# Patient Record
Sex: MALE | NOT HISPANIC OR LATINO | URBAN - METROPOLITAN AREA
[De-identification: names, ages, dates, MRNs, and addresses within clinical notes are randomized per-mention and may not be internally consistent; named-entity substitution may affect disease eponyms.]

---

## 2017-10-27 ENCOUNTER — EMERGENCY (EMERGENCY)
Facility: HOSPITAL | Age: 27
LOS: 1 days | Discharge: LEFT BEFORE DISCHARGE | End: 2017-10-27

## 2017-10-27 VITALS
HEART RATE: 75 BPM | HEIGHT: 66.93 IN | WEIGHT: 132.28 LBS | DIASTOLIC BLOOD PRESSURE: 61 MMHG | SYSTOLIC BLOOD PRESSURE: 96 MMHG | TEMPERATURE: 97 F | RESPIRATION RATE: 16 BRPM | OXYGEN SATURATION: 98 %

## 2017-10-27 DIAGNOSIS — Z53.21 PROCEDURE AND TREATMENT NOT CARRIED OUT DUE TO PATIENT LEAVING PRIOR TO BEING SEEN BY HEALTH CARE PROVIDER: ICD-10-CM

## 2017-10-27 DIAGNOSIS — R10.9 UNSPECIFIED ABDOMINAL PAIN: ICD-10-CM

## 2017-10-27 DIAGNOSIS — M54.9 DORSALGIA, UNSPECIFIED: ICD-10-CM

## 2021-08-30 ENCOUNTER — INPATIENT (INPATIENT)
Facility: HOSPITAL | Age: 31
LOS: 3 days | Discharge: HOME | DRG: 871 | End: 2021-09-03
Attending: INTERNAL MEDICINE | Admitting: INTERNAL MEDICINE
Payer: MEDICAID

## 2021-08-30 VITALS
WEIGHT: 141.1 LBS | HEART RATE: 121 BPM | OXYGEN SATURATION: 94 % | SYSTOLIC BLOOD PRESSURE: 101 MMHG | RESPIRATION RATE: 22 BRPM | DIASTOLIC BLOOD PRESSURE: 72 MMHG | TEMPERATURE: 100 F | HEIGHT: 66.93 IN

## 2021-08-30 DIAGNOSIS — Z98.890 OTHER SPECIFIED POSTPROCEDURAL STATES: Chronic | ICD-10-CM

## 2021-08-30 DIAGNOSIS — R79.89 OTHER SPECIFIED ABNORMAL FINDINGS OF BLOOD CHEMISTRY: ICD-10-CM

## 2021-08-30 DIAGNOSIS — U07.1 COVID-19: ICD-10-CM

## 2021-08-30 DIAGNOSIS — Z29.9 ENCOUNTER FOR PROPHYLACTIC MEASURES, UNSPECIFIED: ICD-10-CM

## 2021-08-30 DIAGNOSIS — J18.9 PNEUMONIA, UNSPECIFIED ORGANISM: ICD-10-CM

## 2021-08-30 DIAGNOSIS — R09.89 OTHER SPECIFIED SYMPTOMS AND SIGNS INVOLVING THE CIRCULATORY AND RESPIRATORY SYSTEMS: ICD-10-CM

## 2021-08-30 LAB
ALBUMIN SERPL ELPH-MCNC: 3.3 G/DL — LOW (ref 3.5–5)
ALP SERPL-CCNC: 41 U/L — SIGNIFICANT CHANGE UP (ref 40–120)
ALT FLD-CCNC: 30 U/L DA — SIGNIFICANT CHANGE UP (ref 10–60)
ANION GAP SERPL CALC-SCNC: 6 MMOL/L — SIGNIFICANT CHANGE UP (ref 5–17)
AST SERPL-CCNC: 21 U/L — SIGNIFICANT CHANGE UP (ref 10–40)
BASOPHILS # BLD AUTO: 0.01 K/UL — SIGNIFICANT CHANGE UP (ref 0–0.2)
BASOPHILS NFR BLD AUTO: 0.3 % — SIGNIFICANT CHANGE UP (ref 0–2)
BILIRUB SERPL-MCNC: 0.3 MG/DL — SIGNIFICANT CHANGE UP (ref 0.2–1.2)
BUN SERPL-MCNC: 10 MG/DL — SIGNIFICANT CHANGE UP (ref 7–18)
CALCIUM SERPL-MCNC: 7.9 MG/DL — LOW (ref 8.4–10.5)
CHLORIDE SERPL-SCNC: 107 MMOL/L — SIGNIFICANT CHANGE UP (ref 96–108)
CO2 SERPL-SCNC: 26 MMOL/L — SIGNIFICANT CHANGE UP (ref 22–31)
CREAT SERPL-MCNC: 0.97 MG/DL — SIGNIFICANT CHANGE UP (ref 0.5–1.3)
D DIMER BLD IA.RAPID-MCNC: 442 NG/ML DDU — HIGH
EOSINOPHIL # BLD AUTO: 0.02 K/UL — SIGNIFICANT CHANGE UP (ref 0–0.5)
EOSINOPHIL NFR BLD AUTO: 0.6 % — SIGNIFICANT CHANGE UP (ref 0–6)
FIBRINOGEN PPP-MCNC: 469 MG/DL — SIGNIFICANT CHANGE UP (ref 290–520)
GLUCOSE BLDC GLUCOMTR-MCNC: 102 MG/DL — HIGH (ref 70–99)
GLUCOSE SERPL-MCNC: 112 MG/DL — HIGH (ref 70–99)
HCT VFR BLD CALC: 41 % — SIGNIFICANT CHANGE UP (ref 39–50)
HGB BLD-MCNC: 14.1 G/DL — SIGNIFICANT CHANGE UP (ref 13–17)
IMM GRANULOCYTES NFR BLD AUTO: 0.6 % — SIGNIFICANT CHANGE UP (ref 0–1.5)
LDH SERPL L TO P-CCNC: 288 U/L — HIGH (ref 120–225)
LYMPHOCYTES # BLD AUTO: 1.13 K/UL — SIGNIFICANT CHANGE UP (ref 1–3.3)
LYMPHOCYTES # BLD AUTO: 36.7 % — SIGNIFICANT CHANGE UP (ref 13–44)
MCHC RBC-ENTMCNC: 30.5 PG — SIGNIFICANT CHANGE UP (ref 27–34)
MCHC RBC-ENTMCNC: 34.4 GM/DL — SIGNIFICANT CHANGE UP (ref 32–36)
MCV RBC AUTO: 88.6 FL — SIGNIFICANT CHANGE UP (ref 80–100)
MONOCYTES # BLD AUTO: 0.22 K/UL — SIGNIFICANT CHANGE UP (ref 0–0.9)
MONOCYTES NFR BLD AUTO: 7.1 % — SIGNIFICANT CHANGE UP (ref 2–14)
NEUTROPHILS # BLD AUTO: 1.68 K/UL — LOW (ref 1.8–7.4)
NEUTROPHILS NFR BLD AUTO: 54.7 % — SIGNIFICANT CHANGE UP (ref 43–77)
NRBC # BLD: 0 /100 WBCS — SIGNIFICANT CHANGE UP (ref 0–0)
PLATELET # BLD AUTO: 150 K/UL — SIGNIFICANT CHANGE UP (ref 150–400)
POTASSIUM SERPL-MCNC: 3.8 MMOL/L — SIGNIFICANT CHANGE UP (ref 3.5–5.3)
POTASSIUM SERPL-SCNC: 3.8 MMOL/L — SIGNIFICANT CHANGE UP (ref 3.5–5.3)
PROT SERPL-MCNC: 7 G/DL — SIGNIFICANT CHANGE UP (ref 6–8.3)
RBC # BLD: 4.63 M/UL — SIGNIFICANT CHANGE UP (ref 4.2–5.8)
RBC # FLD: 12.5 % — SIGNIFICANT CHANGE UP (ref 10.3–14.5)
SARS-COV-2 RNA SPEC QL NAA+PROBE: DETECTED
SODIUM SERPL-SCNC: 139 MMOL/L — SIGNIFICANT CHANGE UP (ref 135–145)
TROPONIN I SERPL-MCNC: <0.015 NG/ML — SIGNIFICANT CHANGE UP (ref 0–0.04)
WBC # BLD: 3.08 K/UL — LOW (ref 3.8–10.5)
WBC # FLD AUTO: 3.08 K/UL — LOW (ref 3.8–10.5)

## 2021-08-30 PROCEDURE — 99285 EMERGENCY DEPT VISIT HI MDM: CPT | Mod: CS

## 2021-08-30 PROCEDURE — 71045 X-RAY EXAM CHEST 1 VIEW: CPT | Mod: 26

## 2021-08-30 RX ORDER — AZITHROMYCIN 500 MG/1
500 TABLET, FILM COATED ORAL DAILY
Refills: 0 | Status: DISCONTINUED | OUTPATIENT
Start: 2021-08-30 | End: 2021-09-01

## 2021-08-30 RX ORDER — CEFTRIAXONE 500 MG/1
1000 INJECTION, POWDER, FOR SOLUTION INTRAMUSCULAR; INTRAVENOUS EVERY 24 HOURS
Refills: 0 | Status: DISCONTINUED | OUTPATIENT
Start: 2021-08-30 | End: 2021-09-01

## 2021-08-30 RX ORDER — REMDESIVIR 5 MG/ML
INJECTION INTRAVENOUS
Refills: 0 | Status: DISCONTINUED | OUTPATIENT
Start: 2021-08-30 | End: 2021-09-03

## 2021-08-30 RX ORDER — ACETAMINOPHEN 500 MG
975 TABLET ORAL ONCE
Refills: 0 | Status: COMPLETED | OUTPATIENT
Start: 2021-08-30 | End: 2021-08-30

## 2021-08-30 RX ORDER — DEXAMETHASONE 0.5 MG/5ML
6 ELIXIR ORAL DAILY
Refills: 0 | Status: DISCONTINUED | OUTPATIENT
Start: 2021-08-30 | End: 2021-09-03

## 2021-08-30 RX ORDER — REMDESIVIR 5 MG/ML
100 INJECTION INTRAVENOUS EVERY 24 HOURS
Refills: 0 | Status: DISCONTINUED | OUTPATIENT
Start: 2021-08-31 | End: 2021-09-03

## 2021-08-30 RX ORDER — ALBUTEROL 90 UG/1
2 AEROSOL, METERED ORAL EVERY 4 HOURS
Refills: 0 | Status: DISCONTINUED | OUTPATIENT
Start: 2021-08-30 | End: 2021-09-03

## 2021-08-30 RX ORDER — SODIUM CHLORIDE 9 MG/ML
1000 INJECTION INTRAMUSCULAR; INTRAVENOUS; SUBCUTANEOUS ONCE
Refills: 0 | Status: COMPLETED | OUTPATIENT
Start: 2021-08-30 | End: 2021-08-30

## 2021-08-30 RX ORDER — ACETAMINOPHEN 500 MG
650 TABLET ORAL EVERY 4 HOURS
Refills: 0 | Status: DISCONTINUED | OUTPATIENT
Start: 2021-08-30 | End: 2021-09-03

## 2021-08-30 RX ORDER — ENOXAPARIN SODIUM 100 MG/ML
40 INJECTION SUBCUTANEOUS DAILY
Refills: 0 | Status: DISCONTINUED | OUTPATIENT
Start: 2021-08-30 | End: 2021-09-03

## 2021-08-30 RX ORDER — REMDESIVIR 5 MG/ML
200 INJECTION INTRAVENOUS EVERY 24 HOURS
Refills: 0 | Status: COMPLETED | OUTPATIENT
Start: 2021-08-30 | End: 2021-08-30

## 2021-08-30 RX ORDER — PANTOPRAZOLE SODIUM 20 MG/1
40 TABLET, DELAYED RELEASE ORAL
Refills: 0 | Status: DISCONTINUED | OUTPATIENT
Start: 2021-08-30 | End: 2021-09-03

## 2021-08-30 RX ORDER — INSULIN LISPRO 100/ML
VIAL (ML) SUBCUTANEOUS
Refills: 0 | Status: DISCONTINUED | OUTPATIENT
Start: 2021-08-30 | End: 2021-09-03

## 2021-08-30 RX ORDER — DEXAMETHASONE 0.5 MG/5ML
6 ELIXIR ORAL ONCE
Refills: 0 | Status: COMPLETED | OUTPATIENT
Start: 2021-08-30 | End: 2021-08-30

## 2021-08-30 RX ADMIN — CEFTRIAXONE 100 MILLIGRAM(S): 500 INJECTION, POWDER, FOR SOLUTION INTRAMUSCULAR; INTRAVENOUS at 21:14

## 2021-08-30 RX ADMIN — SODIUM CHLORIDE 1000 MILLILITER(S): 9 INJECTION INTRAMUSCULAR; INTRAVENOUS; SUBCUTANEOUS at 15:46

## 2021-08-30 RX ADMIN — PANTOPRAZOLE SODIUM 40 MILLIGRAM(S): 20 TABLET, DELAYED RELEASE ORAL at 20:45

## 2021-08-30 RX ADMIN — REMDESIVIR 500 MILLIGRAM(S): 5 INJECTION INTRAVENOUS at 20:45

## 2021-08-30 RX ADMIN — ENOXAPARIN SODIUM 40 MILLIGRAM(S): 100 INJECTION SUBCUTANEOUS at 20:45

## 2021-08-30 RX ADMIN — Medication 975 MILLIGRAM(S): at 15:46

## 2021-08-30 RX ADMIN — Medication 975 MILLIGRAM(S): at 16:00

## 2021-08-30 RX ADMIN — Medication 6 MILLIGRAM(S): at 19:11

## 2021-08-30 RX ADMIN — AZITHROMYCIN 500 MILLIGRAM(S): 500 TABLET, FILM COATED ORAL at 21:14

## 2021-08-30 RX ADMIN — Medication 6 MILLIGRAM(S): at 20:45

## 2021-08-30 NOTE — H&P ADULT - NSHPSOCIALHISTORY_GEN_ALL_CORE
Patient is  with two kids who live in [facility]. He visits them every 5 months.

## 2021-08-30 NOTE — H&P ADULT - NSHPPHYSICALEXAM_GEN_ALL_CORE
T(C): 39.4 (08-30-21 @ 15:20), Max: 39.4 (08-30-21 @ 15:20)  HR: 121 (08-30-21 @ 14:55) (121 - 121)  BP: 101/72 (08-30-21 @ 14:55) (101/72 - 101/72)  RR: 22 (08-30-21 @ 14:55) (22 - 22)  SpO2: 94% (08-30-21 @ 14:55) (94% - 94%)    GENERAL: patient resting comfortably on NC, no acute distress, appropriate, pleasant  EYES: sclera clear, no exudates  ENMT: oropharynx clear without erythema, no exudates, moist mucous membranes  NECK: supple, soft, no thyromegaly noted  LUNGS: Diffuse rhonchi, decreased breath sounds bilaterally at the bases. Coughing on deep inspiration. no wheezing, rhonchi or rales appreciated  HEART: Clear S1/S2, regular rate and rhythm, no murmurs noted, no lower extremity edema  GASTROINTESTINAL: abdomen is soft, nontender, nondistended, normoactive bowel sounds, no palpable masses  INTEGUMENT: good skin turgor, no lesions noted  MUSCULOSKELETAL: no clubbing or cyanosis, no obvious deformity  NEUROLOGIC: AAO x3, good muscle tone in 4 extremities, no obvious sensory deficits  PSYCHIATRIC: mood is good, affect is congruent, linear and logical thought process  HEME/LYMPH: no palpable supraclavicular nodules, no obvious ecchymosis or petechiae

## 2021-08-30 NOTE — ED ADULT NURSE NOTE - OBJECTIVE STATEMENT
MARIANNE SY COVERING NOTES: AOX4 +ambulatory patient reports he works as   and he just came back from [place]. Patient complaining of cough and chest pains

## 2021-08-30 NOTE — H&P ADULT - HISTORY OF PRESENT ILLNESS
30 YOM, , with no significant PMH presents with fevers and chest pain for 5 days. Patient is UNVACCINATED. Patient was experiencing sore throat on his ride from [place] to [place] and began to develop subjective fever pain, shortness of breath, cough and myalgias. No hemoptysis or swelling of legs. No loss of smell or taste. No headache, dizziness, n/v/d/c, or urinary complaints. Patient is a nonsmoker. Patient has no known COVID contacts, lives alone, and has minimal contact with others on the road.    In ED: Patient is febrile to 103F, , RR 22, Sat 94% on RA   CXR shows bilateral Infiltrates  COVID PCR+

## 2021-08-30 NOTE — ED PROVIDER NOTE - DR. NAME
“You can access the FollowHealth Patient Portal, offered by [facility], by registering with the following website: [email or web address]”
Joseph Mc)

## 2021-08-30 NOTE — H&P ADULT - PROBLEM SELECTOR PLAN 1
- Patient p/w SOB, fever to 103F, tachy to 124, RR 22, Desaturating to 94 % on RA  - Unvaccinated  - CXR shows bilateral infiltrates   - COVID PCR+  - Pt resting comfortably on 2 L NC .  - Supportive measures (tylenol).  - Started on remdsivir and decadron.   - F/u COVID inflammatory markers .  - Monitor respiratory status - Patient p/w SOB, fever to 103F, tachy to 124, RR 22, Desaturating to 94 % on RA  - Unvaccinated  - CXR shows bilateral infiltrates   - COVID PCR+  - Pt resting comfortably on 2 L NC .  - Supportive measures (tylenol).  - Started on remdsivir and decadron.   - F/u COVID inflammatory markers .  - Monitor respiratory status  Zithromax and rocephin ordered, D/c if procal wnl  -ID [place] for remdesivir

## 2021-08-30 NOTE — H&P ADULT - ATTENDING COMMENTS
30 YOM, , with no significant PMH presents with fevers and chest pain for 5 days. Patient is UNVACCINATED. Patient was experiencing sore throat on his ride from [place] to [place] and began to develop subjective fever pain, shortness of breath, cough and myalgias. No hemoptysis or swelling of legs. No loss of smell or taste. No headache, dizziness, n/v/d/c, or urinary complaints. Patient is a nonsmoker. Patient has no known COVID contacts, lives alone, and has minimal contact with others on the road.    In ED: Patient is febrile to 103F, , RR 22, Sat 94% on RA   CXR shows bilateral Infiltrates  COVID PCR+      assessment  --  sepsis, pneumonia 2nd to covid 19,     plan  --  admit to med, rocephin, zithromax, decadron, vit c, vitamin d, zinc, pepcid, singulair, contact and airborne isolation, cont albuterol inhaler, cont supportive care with tylenol prn, robitussin prn and O2 via nasal canula as needed, lovenox    covid-19 antibody test, procalcitonin, D-dimer, crp, ldh, ferritin, lactate, cbc, bmp, mg, phos, lipids, tsh, bld cx, ua, ucx      pulm cons

## 2021-08-30 NOTE — H&P ADULT - PROBLEM SELECTOR PLAN 2
-Ddimer to 400  -Patient is a , presented with tachycardia, SOB   -no calf swelling or hemoptysis  - PERC 2  -Consider CTPA ?

## 2021-08-30 NOTE — ED PROVIDER NOTE - CLINICAL SUMMARY MEDICAL DECISION MAKING FREE TEXT BOX
Very likely covid 19, however PNA possibility. Will get CXR, basic blood work, covid swab, 2L nasal cannula, and admit.

## 2021-08-30 NOTE — ED PROVIDER NOTE - CARE PLAN
Principal Discharge DX:	Multifocal pneumonia  Assessment and plan of treatment:	presumptive:  Secondary Diagnosis:	2019 novel coronavirus disease (COVID-19)   1

## 2021-08-30 NOTE — H&P ADULT - ASSESSMENT
30 YOM, , no signifcant PMH, presented with fevers, myalgias, cough, SOB. Bilateral infiltrates on CXR. Admitted to medicine for COVID PNA+ requiring O2 supplementation with NC

## 2021-08-31 DIAGNOSIS — R79.89 OTHER SPECIFIED ABNORMAL FINDINGS OF BLOOD CHEMISTRY: ICD-10-CM

## 2021-08-31 DIAGNOSIS — Z02.9 ENCOUNTER FOR ADMINISTRATIVE EXAMINATIONS, UNSPECIFIED: ICD-10-CM

## 2021-08-31 LAB
A1C WITH ESTIMATED AVERAGE GLUCOSE RESULT: 5.6 % — SIGNIFICANT CHANGE UP (ref 4–5.6)
ALBUMIN SERPL ELPH-MCNC: 3.4 G/DL — LOW (ref 3.5–5)
ALP SERPL-CCNC: 44 U/L — SIGNIFICANT CHANGE UP (ref 40–120)
ALT FLD-CCNC: 30 U/L DA — SIGNIFICANT CHANGE UP (ref 10–60)
ANION GAP SERPL CALC-SCNC: 6 MMOL/L — SIGNIFICANT CHANGE UP (ref 5–17)
AST SERPL-CCNC: 18 U/L — SIGNIFICANT CHANGE UP (ref 10–40)
BASOPHILS # BLD AUTO: 0 K/UL — SIGNIFICANT CHANGE UP (ref 0–0.2)
BASOPHILS NFR BLD AUTO: 0 % — SIGNIFICANT CHANGE UP (ref 0–2)
BILIRUB SERPL-MCNC: 0.4 MG/DL — SIGNIFICANT CHANGE UP (ref 0.2–1.2)
BUN SERPL-MCNC: 10 MG/DL — SIGNIFICANT CHANGE UP (ref 7–18)
CALCIUM SERPL-MCNC: 8.5 MG/DL — SIGNIFICANT CHANGE UP (ref 8.4–10.5)
CHLORIDE SERPL-SCNC: 106 MMOL/L — SIGNIFICANT CHANGE UP (ref 96–108)
CHOLEST SERPL-MCNC: 168 MG/DL — SIGNIFICANT CHANGE UP
CO2 SERPL-SCNC: 28 MMOL/L — SIGNIFICANT CHANGE UP (ref 22–31)
COVID-19 NUCLEOCAPSID GAM AB INTERP: POSITIVE
COVID-19 NUCLEOCAPSID TOTAL GAM ANTIBODY RESULT: 1.99 INDEX — HIGH
COVID-19 SPIKE DOMAIN AB INTERP: NEGATIVE — SIGNIFICANT CHANGE UP
COVID-19 SPIKE DOMAIN ANTIBODY RESULT: 0.55 U/ML — SIGNIFICANT CHANGE UP
CREAT SERPL-MCNC: 0.71 MG/DL — SIGNIFICANT CHANGE UP (ref 0.5–1.3)
CRP SERPL-MCNC: 16 MG/L — HIGH
CRP SERPL-MCNC: 21 MG/L — HIGH
D DIMER BLD IA.RAPID-MCNC: 207 NG/ML DDU — SIGNIFICANT CHANGE UP
EOSINOPHIL # BLD AUTO: 0 K/UL — SIGNIFICANT CHANGE UP (ref 0–0.5)
EOSINOPHIL NFR BLD AUTO: 0 % — SIGNIFICANT CHANGE UP (ref 0–6)
ESTIMATED AVERAGE GLUCOSE: 114 MG/DL — SIGNIFICANT CHANGE UP (ref 68–114)
FERRITIN SERPL-MCNC: 357 NG/ML — SIGNIFICANT CHANGE UP (ref 30–400)
FERRITIN SERPL-MCNC: 381 NG/ML — SIGNIFICANT CHANGE UP (ref 30–400)
GLUCOSE BLDC GLUCOMTR-MCNC: 112 MG/DL — HIGH (ref 70–99)
GLUCOSE BLDC GLUCOMTR-MCNC: 130 MG/DL — HIGH (ref 70–99)
GLUCOSE BLDC GLUCOMTR-MCNC: 156 MG/DL — HIGH (ref 70–99)
GLUCOSE BLDC GLUCOMTR-MCNC: 161 MG/DL — HIGH (ref 70–99)
GLUCOSE SERPL-MCNC: 119 MG/DL — HIGH (ref 70–99)
HCT VFR BLD CALC: 45.1 % — SIGNIFICANT CHANGE UP (ref 39–50)
HDLC SERPL-MCNC: 40 MG/DL — LOW
HGB BLD-MCNC: 15.4 G/DL — SIGNIFICANT CHANGE UP (ref 13–17)
IMM GRANULOCYTES NFR BLD AUTO: 0.7 % — SIGNIFICANT CHANGE UP (ref 0–1.5)
LIPID PNL WITH DIRECT LDL SERPL: 114 MG/DL — HIGH
LYMPHOCYTES # BLD AUTO: 0.58 K/UL — LOW (ref 1–3.3)
LYMPHOCYTES # BLD AUTO: 40.8 % — SIGNIFICANT CHANGE UP (ref 13–44)
MAGNESIUM SERPL-MCNC: 2.3 MG/DL — SIGNIFICANT CHANGE UP (ref 1.6–2.6)
MANUAL SMEAR VERIFICATION: SIGNIFICANT CHANGE UP
MCHC RBC-ENTMCNC: 30.3 PG — SIGNIFICANT CHANGE UP (ref 27–34)
MCHC RBC-ENTMCNC: 34.1 GM/DL — SIGNIFICANT CHANGE UP (ref 32–36)
MCV RBC AUTO: 88.8 FL — SIGNIFICANT CHANGE UP (ref 80–100)
MONOCYTES # BLD AUTO: 0.1 K/UL — SIGNIFICANT CHANGE UP (ref 0–0.9)
MONOCYTES NFR BLD AUTO: 7 % — SIGNIFICANT CHANGE UP (ref 2–14)
NEUTROPHILS # BLD AUTO: 0.73 K/UL — LOW (ref 1.8–7.4)
NEUTROPHILS NFR BLD AUTO: 51.5 % — SIGNIFICANT CHANGE UP (ref 43–77)
NON HDL CHOLESTEROL: 128 MG/DL — SIGNIFICANT CHANGE UP
NRBC # BLD: 0 /100 WBCS — SIGNIFICANT CHANGE UP (ref 0–0)
PHOSPHATE SERPL-MCNC: 3.2 MG/DL — SIGNIFICANT CHANGE UP (ref 2.5–4.5)
PLAT MORPH BLD: NORMAL — SIGNIFICANT CHANGE UP
PLATELET # BLD AUTO: 162 K/UL — SIGNIFICANT CHANGE UP (ref 150–400)
POTASSIUM SERPL-MCNC: 4.2 MMOL/L — SIGNIFICANT CHANGE UP (ref 3.5–5.3)
POTASSIUM SERPL-SCNC: 4.2 MMOL/L — SIGNIFICANT CHANGE UP (ref 3.5–5.3)
PROCALCITONIN SERPL-MCNC: 0.03 NG/ML — SIGNIFICANT CHANGE UP (ref 0.02–0.1)
PROCALCITONIN SERPL-MCNC: 0.04 NG/ML — SIGNIFICANT CHANGE UP (ref 0.02–0.1)
PROT SERPL-MCNC: 7.7 G/DL — SIGNIFICANT CHANGE UP (ref 6–8.3)
RBC # BLD: 5.08 M/UL — SIGNIFICANT CHANGE UP (ref 4.2–5.8)
RBC # FLD: 12.4 % — SIGNIFICANT CHANGE UP (ref 10.3–14.5)
RBC BLD AUTO: NORMAL — SIGNIFICANT CHANGE UP
SARS-COV-2 IGG+IGM SERPL QL IA: 0.55 U/ML — SIGNIFICANT CHANGE UP
SARS-COV-2 IGG+IGM SERPL QL IA: 1.99 INDEX — HIGH
SARS-COV-2 IGG+IGM SERPL QL IA: NEGATIVE — SIGNIFICANT CHANGE UP
SARS-COV-2 IGG+IGM SERPL QL IA: POSITIVE
SODIUM SERPL-SCNC: 140 MMOL/L — SIGNIFICANT CHANGE UP (ref 135–145)
TRIGL SERPL-MCNC: 68 MG/DL — SIGNIFICANT CHANGE UP
TSH SERPL-MCNC: 0.21 UU/ML — LOW (ref 0.34–4.82)
WBC # BLD: 1.42 K/UL — LOW (ref 3.8–10.5)
WBC # FLD AUTO: 1.42 K/UL — LOW (ref 3.8–10.5)

## 2021-08-31 RX ORDER — CHOLECALCIFEROL (VITAMIN D3) 125 MCG
2000 CAPSULE ORAL DAILY
Refills: 0 | Status: DISCONTINUED | OUTPATIENT
Start: 2021-08-31 | End: 2021-09-03

## 2021-08-31 RX ORDER — ASCORBIC ACID 60 MG
1000 TABLET,CHEWABLE ORAL DAILY
Refills: 0 | Status: DISCONTINUED | OUTPATIENT
Start: 2021-08-31 | End: 2021-09-03

## 2021-08-31 RX ORDER — ZINC SULFATE TAB 220 MG (50 MG ZINC EQUIVALENT) 220 (50 ZN) MG
220 TAB ORAL DAILY
Refills: 0 | Status: DISCONTINUED | OUTPATIENT
Start: 2021-08-31 | End: 2021-09-03

## 2021-08-31 RX ORDER — MONTELUKAST 4 MG/1
10 TABLET, CHEWABLE ORAL AT BEDTIME
Refills: 0 | Status: DISCONTINUED | OUTPATIENT
Start: 2021-08-31 | End: 2021-09-03

## 2021-08-31 RX ADMIN — ENOXAPARIN SODIUM 40 MILLIGRAM(S): 100 INJECTION SUBCUTANEOUS at 11:24

## 2021-08-31 RX ADMIN — PANTOPRAZOLE SODIUM 40 MILLIGRAM(S): 20 TABLET, DELAYED RELEASE ORAL at 07:35

## 2021-08-31 RX ADMIN — MONTELUKAST 10 MILLIGRAM(S): 4 TABLET, CHEWABLE ORAL at 22:56

## 2021-08-31 RX ADMIN — Medication 1: at 11:50

## 2021-08-31 RX ADMIN — REMDESIVIR 500 MILLIGRAM(S): 5 INJECTION INTRAVENOUS at 22:08

## 2021-08-31 RX ADMIN — Medication 1000 MILLIGRAM(S): at 11:25

## 2021-08-31 RX ADMIN — AZITHROMYCIN 500 MILLIGRAM(S): 500 TABLET, FILM COATED ORAL at 11:26

## 2021-08-31 RX ADMIN — CEFTRIAXONE 100 MILLIGRAM(S): 500 INJECTION, POWDER, FOR SOLUTION INTRAMUSCULAR; INTRAVENOUS at 22:56

## 2021-08-31 RX ADMIN — Medication 6 MILLIGRAM(S): at 07:34

## 2021-08-31 RX ADMIN — Medication 2000 UNIT(S): at 11:25

## 2021-08-31 RX ADMIN — Medication 1: at 17:13

## 2021-08-31 RX ADMIN — ZINC SULFATE TAB 220 MG (50 MG ZINC EQUIVALENT) 220 MILLIGRAM(S): 220 (50 ZN) TAB at 11:25

## 2021-08-31 NOTE — PROGRESS NOTE ADULT - PROBLEM SELECTOR PLAN 2
TSH low  possible COVID relation  f/u outpatient with Endocrinologist as patient is asymptomatic for Hyperthyroidism

## 2021-08-31 NOTE — PROGRESS NOTE ADULT - SUBJECTIVE AND OBJECTIVE BOX
Patient is a 30y old  Male who presents with a chief complaint of COVID-19 (30 Aug 2021 19:55)    pt seen in icu [  ], reg med floor [   ], bed [  ], chair at bedside [   ], a+o x3 [  ], lethargic [  ],  nad [  ]    campbell [  ], ngt [  ], peg [  ], et tube [  ], cent line [  ], picc line [  ]        Allergies    No Known Allergies        Vitals    T(F): 97.3 (08-31-21 @ 05:31), Max: 103 (08-30-21 @ 15:20)  HR: 65 (08-31-21 @ 05:31) (63 - 121)  BP: 100/63 (08-31-21 @ 05:31) (93/62 - 110/76)  RR: 18 (08-31-21 @ 05:31) (17 - 22)  SpO2: 99% (08-31-21 @ 05:31) (94% - 99%)  Wt(kg): --  CAPILLARY BLOOD GLUCOSE      POCT Blood Glucose.: 102 mg/dL (30 Aug 2021 21:07)      Labs                          14.1   3.08  )-----------( 150      ( 30 Aug 2021 16:12 )             41.0       08-30    139  |  107  |  10  ----------------------------<  112<H>  3.8   |  26  |  0.97    Ca    7.9<L>      30 Aug 2021 16:11    TPro  7.0  /  Alb  3.3<L>  /  TBili  0.3  /  DBili  x   /  AST  21  /  ALT  30  /  AlkPhos  41  08-30      CARDIAC MARKERS ( 30 Aug 2021 16:11 )  <0.015 ng/mL / x     / x     / x     / x                Radiology Results      Meds    MEDICATIONS  (STANDING):  ascorbic acid 1000 milliGRAM(s) Oral daily  azithromycin   Tablet 500 milliGRAM(s) Oral daily  cefTRIAXone   IVPB 1000 milliGRAM(s) IV Intermittent every 24 hours  cholecalciferol 2000 Unit(s) Oral daily  dexAMETHasone  Injectable 6 milliGRAM(s) IV Push daily  enoxaparin Injectable 40 milliGRAM(s) SubCutaneous daily  insulin lispro (ADMELOG) corrective regimen sliding scale   SubCutaneous Before meals and at bedtime  montelukast 10 milliGRAM(s) Oral at bedtime  pantoprazole    Tablet 40 milliGRAM(s) Oral before breakfast  remdesivir  IVPB   IV Intermittent   remdesivir  IVPB 100 milliGRAM(s) IV Intermittent every 24 hours  zinc sulfate 220 milliGRAM(s) Oral daily      MEDICATIONS  (PRN):  acetaminophen   Tablet .. 650 milliGRAM(s) Oral every 4 hours PRN Temp greater or equal to 38.5C (101.3F)  ALBUTerol    90 MICROgram(s) HFA Inhaler 2 Puff(s) Inhalation every 4 hours PRN Shortness of Breath and/or Wheezing      Physical Exam    Neuro :  no focal deficits  Respiratory: CTA B/L  CV: RRR, S1S2, no murmurs,   Abdominal: Soft, NT, ND +BS,  Extremities: No edema, + peripheral pulses    ASSESSMENT    Pneumonia due to organism    No pertinent past medical history    H/O bilateral inguinal hernia repair        PLAN     Patient is a 30y old  Male who presents with a chief complaint of COVID-19 (30 Aug 2021 19:55)    pt seen in icu [  ], reg med floor [  x ], bed [ x ], chair at bedside [   ], a+o x3 [ x ], lethargic [  ],  nad [ x ]      Allergies    No Known Allergies        Vitals    T(F): 97.3 (08-31-21 @ 05:31), Max: 103 (08-30-21 @ 15:20)  HR: 65 (08-31-21 @ 05:31) (63 - 121)  BP: 100/63 (08-31-21 @ 05:31) (93/62 - 110/76)  RR: 18 (08-31-21 @ 05:31) (17 - 22)  SpO2: 99% (08-31-21 @ 05:31) (94% - 99%)  Wt(kg): --  CAPILLARY BLOOD GLUCOSE      POCT Blood Glucose.: 102 mg/dL (30 Aug 2021 21:07)      Labs                          14.1   3.08  )-----------( 150      ( 30 Aug 2021 16:12 )             41.0       08-30    139  |  107  |  10  ----------------------------<  112<H>  3.8   |  26  |  0.97    Ca    7.9<L>      30 Aug 2021 16:11    TPro  7.0  /  Alb  3.3<L>  /  TBili  0.3  /  DBili  x   /  AST  21  /  ALT  30  /  AlkPhos  41  08-30    Procalcitonin, Serum (08.31.21 @ 01:10)   Procalcitonin, Serum: 0.04:    CARDIAC MARKERS ( 30 Aug 2021 16:11 )  <0.015 ng/mL / x     / x     / x     / x                Radiology Results      Meds    MEDICATIONS  (STANDING):  ascorbic acid 1000 milliGRAM(s) Oral daily  azithromycin   Tablet 500 milliGRAM(s) Oral daily  cefTRIAXone   IVPB 1000 milliGRAM(s) IV Intermittent every 24 hours  cholecalciferol 2000 Unit(s) Oral daily  dexAMETHasone  Injectable 6 milliGRAM(s) IV Push daily  enoxaparin Injectable 40 milliGRAM(s) SubCutaneous daily  insulin lispro (ADMELOG) corrective regimen sliding scale   SubCutaneous Before meals and at bedtime  montelukast 10 milliGRAM(s) Oral at bedtime  pantoprazole    Tablet 40 milliGRAM(s) Oral before breakfast  remdesivir  IVPB   IV Intermittent   remdesivir  IVPB 100 milliGRAM(s) IV Intermittent every 24 hours  zinc sulfate 220 milliGRAM(s) Oral daily      MEDICATIONS  (PRN):  acetaminophen   Tablet .. 650 milliGRAM(s) Oral every 4 hours PRN Temp greater or equal to 38.5C (101.3F)  ALBUTerol    90 MICROgram(s) HFA Inhaler 2 Puff(s) Inhalation every 4 hours PRN Shortness of Breath and/or Wheezing      Physical Exam    Neuro :  no focal deficits  Respiratory: CTA B/L  CV: RRR, S1S2, no murmurs,   Abdominal: Soft, NT, ND +BS,  Extremities: No edema, + peripheral pulses      ASSESSMENT    sepsis,   pneumonia 2nd to covid 19  H/O bilateral inguinal hernia repair        PLAN    d/c rocephin, zithromax given procal neg,   cont decadron,   cont vit c, vitamin d, zinc, pepcid, singulair,   contact and airborne isolation,   pulm cons   cont albuterol inhaler,   tmx 103  cont tylenol prn,   cont robitussin prn   O2 sat  99% (94% - 99%) n/c 2L  cont O2 via nasal canula as needed,   cont lovenox  f/u covid-19 antibody test,   f/u D-dimer, crp, ldh, ferritin, lactate,   cont current meds

## 2021-08-31 NOTE — PROGRESS NOTE ADULT - PROBLEM SELECTOR PLAN 1
SpO2 98% 2LNC  CXR shows bilateral infiltrates   c/w remdsivir and decadron.   f/u COVID inflammatory markers   f/u procalcitonin  c/w Zithromax and rocephin until procalcitonin result normal  -ID Dr. Sabillon

## 2021-08-31 NOTE — CONSULT NOTE ADULT - SUBJECTIVE AND OBJECTIVE BOX
PULMONARY CONSULT NOTE      AWAIS SEVILLA  MRN-[id number]    Patient is a 30y old  Male who presents with a chief complaint of COVID-19 (31 Aug 2021 07:42)      HISTORY OF PRESENT ILLNESS:  History of Present Illness:  Reason for Admission: COVID-19  History of Present Illness:   30 YOM, , with no significant PMH presents with fevers and chest pain for 5 days. Patient is UNVACCINATED. Patient was experiencing sore throat on his ride from [place] to [place] and began to develop subjective fever pain, shortness of breath, cough and myalgias. No hemoptysis or swelling of legs. No loss of smell or taste. No headache, dizziness, n/v/d/c, or urinary complaints. Patient is a nonsmoker. Patient has no known COVID contacts, lives alone, and has minimal contact with others on the road.    In ED: Patient is febrile to 103F, , RR 22, Sat 94% on RA   CXR shows bilateral Infiltrates  COVID PCR+    Pt is awake, alert, lying in bed in NAD. On O2 NC     MEDICATIONS  (STANDING):  ascorbic acid 1000 milliGRAM(s) Oral daily  azithromycin   Tablet 500 milliGRAM(s) Oral daily  cefTRIAXone   IVPB 1000 milliGRAM(s) IV Intermittent every 24 hours  cholecalciferol 2000 Unit(s) Oral daily  dexAMETHasone  Injectable 6 milliGRAM(s) IV Push daily  enoxaparin Injectable 40 milliGRAM(s) SubCutaneous daily  insulin lispro (ADMELOG) corrective regimen sliding scale   SubCutaneous Before meals and at bedtime  montelukast 10 milliGRAM(s) Oral at bedtime  pantoprazole    Tablet 40 milliGRAM(s) Oral before breakfast  remdesivir  IVPB   IV Intermittent   remdesivir  IVPB 100 milliGRAM(s) IV Intermittent every 24 hours  zinc sulfate 220 milliGRAM(s) Oral daily      MEDICATIONS  (PRN):  acetaminophen   Tablet .. 650 milliGRAM(s) Oral every 4 hours PRN Temp greater or equal to 38.5C (101.3F)  ALBUTerol    90 MICROgram(s) HFA Inhaler 2 Puff(s) Inhalation every 4 hours PRN Shortness of Breath and/or Wheezing      Allergies    No Known Allergies    Intolerances        PAST MEDICAL & SURGICAL HISTORY:  No pertinent past medical history    H/O bilateral inguinal hernia repair        FAMILY HISTORY:      SOCIAL HISTORY  Smoking History:     REVIEW OF SYSTEMS:    CONSTITUTIONAL:  No fevers, chills, sweats    HEENT:  Eyes:  No diplopia or blurred vision. ENT:  No earache, sore throat or runny nose.    CARDIOVASCULAR:  No pressure, squeezing, tightness, or heaviness about the chest; no palpitations.    RESPIRATORY:  Per HPI    GASTROINTESTINAL:  No abdominal pain, nausea, vomiting or diarrhea.    GENITOURINARY:  No dysuria, frequency or urgency.    NEUROLOGIC:  No paresthesias, fasciculations, seizures or weakness.    PSYCHIATRIC:  No disorder of thought or mood.    Vital Signs Last 24 Hrs  T(C): 36.3 (31 Aug 2021 05:31), Max: 39.4 (30 Aug 2021 15:20)  T(F): 97.3 (31 Aug 2021 05:31), Max: 103 (30 Aug 2021 15:20)  HR: 65 (31 Aug 2021 05:31) (63 - 121)  BP: 100/63 (31 Aug 2021 05:31) (93/62 - 110/76)  BP(mean): --  RR: 18 (31 Aug 2021 05:31) (17 - 22)  SpO2: 99% (31 Aug 2021 05:31) (94% - 99%)  I&O's Detail      PHYSICAL EXAMINATION:    GENERAL: The patient is a well-developed, well-nourished no apparent distress.     HEENT: Head is normocephalic and atraumatic. Extraocular muscles are intact. Mucous membranes are moist.     NECK: Supple.     LUNGS: Clear to auscultation without wheezing, rales, or rhonchi. Respirations unlabored    HEART: Regular rate and rhythm without murmur.    ABDOMEN: Soft, nontender, and nondistended.  No hepatosplenomegaly is noted.    EXTREMITIES: Without any cyanosis, clubbing, rash, lesions or edema.    NEUROLOGIC: Grossly intact.      LABS:                        15.4   1.42  )-----------( 162      ( 31 Aug 2021 08:12 )             45.1     08-31    140  |  106  |  10  ----------------------------<  119<H>  4.2   |  28  |  0.71    Ca    8.5      31 Aug 2021 08:12  Phos  3.2     08-31  Mg     2.3     08-31    TPro  7.7  /  Alb  3.4<L>  /  TBili  0.4  /  DBili  x   /  AST  18  /  ALT  30  /  AlkPhos  44  08-31    COVID-19 PCR . (08.30.21 @ 16:11)   COVID-19 PCR: Detected: EUA/IVD     CARDIAC MARKERS ( 30 Aug 2021 16:11 )  <0.015 ng/mL / x     / x     / x     / x          D-Dimer Assay, Quantitative: 207 ng/mL DDU (08-31-21 @ 08:12)  D-Dimer Assay, Quantitative: 442 ng/mL DDU (08-30-21 @ 16:12)    MICROBIOLOGY:    RADIOLOGY & ADDITIONAL STUDIES:    CXR:  IMPRESSION: Bilateral infiltrates as above.    Ct scan chest:    ekg;    echo:

## 2021-08-31 NOTE — PROGRESS NOTE ADULT - SUBJECTIVE AND OBJECTIVE BOX
NP Note discussed with  Primary Attending    Patient is a 30y old  Male who presents with a chief complaint of COVID-19 (31 Aug 2021 11:02)      INTERVAL HPI/OVERNIGHT EVENTS: Patient seen and examined at bedside. SpO2 99% 2LNC at rest, no new complaints    MEDICATIONS  (STANDING):  ascorbic acid 1000 milliGRAM(s) Oral daily  azithromycin   Tablet 500 milliGRAM(s) Oral daily  cefTRIAXone   IVPB 1000 milliGRAM(s) IV Intermittent every 24 hours  cholecalciferol 2000 Unit(s) Oral daily  dexAMETHasone  Injectable 6 milliGRAM(s) IV Push daily  enoxaparin Injectable 40 milliGRAM(s) SubCutaneous daily  insulin lispro (ADMELOG) corrective regimen sliding scale   SubCutaneous Before meals and at bedtime  montelukast 10 milliGRAM(s) Oral at bedtime  pantoprazole    Tablet 40 milliGRAM(s) Oral before breakfast  remdesivir  IVPB   IV Intermittent   remdesivir  IVPB 100 milliGRAM(s) IV Intermittent every 24 hours  zinc sulfate 220 milliGRAM(s) Oral daily    MEDICATIONS  (PRN):  acetaminophen   Tablet .. 650 milliGRAM(s) Oral every 4 hours PRN Temp greater or equal to 38.5C (101.3F)  ALBUTerol    90 MICROgram(s) HFA Inhaler 2 Puff(s) Inhalation every 4 hours PRN Shortness of Breath and/or Wheezing      __________________________________________________  REVIEW OF SYSTEMS:    CONSTITUTIONAL: No fever,   EYES: no acute visual disturbances  NECK: No pain or stiffness  RESPIRATORY: No cough; No shortness of breath  CARDIOVASCULAR: No chest pain, no palpitations  GASTROINTESTINAL: No pain. No nausea or vomiting; No diarrhea   NEUROLOGICAL: No headache or numbness, no tremors  MUSCULOSKELETAL: No joint pain, no muscle pain  GENITOURINARY: no dysuria, no frequency, no hesitancy  PSYCHIATRY: no depression , no anxiety  ALL OTHER  ROS negative        Vital Signs Last 24 Hrs  T(C): 36.9 (31 Aug 2021 12:21), Max: 39.4 (30 Aug 2021 15:20)  T(F): 98.4 (31 Aug 2021 12:21), Max: 103 (30 Aug 2021 15:20)  HR: 67 (31 Aug 2021 12:21) (63 - 121)  BP: 91/62 (31 Aug 2021 12:21) (91/62 - 110/76)  BP(mean): --  RR: 17 (31 Aug 2021 12:21) (17 - 22)  SpO2: 100% (31 Aug 2021 12:21) (94% - 100%)    ________________________________________________  PHYSICAL EXAM:  GENERAL: NAD  HEENT: Normocephalic;  conjunctivae and sclerae clear; moist mucous membranes;   NECK : supple  CHEST/LUNG: bibasilar crackles  HEART: S1 S2  regular; no murmurs, gallops or rubs  ABDOMEN: Soft, Nontender, Nondistended; Bowel sounds present  EXTREMITIES: no cyanosis; no edema; no calf tenderness  SKIN: warm and dry; no rash  NERVOUS SYSTEM:  Awake and alert; Oriented  to place, person and time ; no new deficits    _________________________________________________  LABS:                        15.4   1.42  )-----------( 162      ( 31 Aug 2021 08:12 )             45.1     08-31    140  |  106  |  10  ----------------------------<  119<H>  4.2   |  28  |  0.71    Ca    8.5      31 Aug 2021 08:12  Phos  3.2     08-31  Mg     2.3     08-31    TPro  7.7  /  Alb  3.4<L>  /  TBili  0.4  /  DBili  x   /  AST  18  /  ALT  30  /  AlkPhos  44  08-31        CAPILLARY BLOOD GLUCOSE      POCT Blood Glucose.: 156 mg/dL (31 Aug 2021 11:33)  POCT Blood Glucose.: 112 mg/dL (31 Aug 2021 08:18)  POCT Blood Glucose.: 102 mg/dL (30 Aug 2021 21:07)        RADIOLOGY & ADDITIONAL TESTS:  < from: Xray Chest 1 View- PORTABLE-Urgent (08.30.21 @ 15:46) >    EXAM:  XR CHEST PORTABLE URGENT 1V                            PROCEDURE DATE:  08/30/2021          INTERPRETATION:  AP erect chest on August 30, 2021 at 3:40 PM. Patient is short of breath with cough and fever.    COMPARISON: None available.    Heart size is within normal limits.    There is slight scattered minimal lower lung field infiltrates mainly at the lung bases right greater than left. These findings are consistent with Covid Pneumonia.    IMPRESSION: Bilateral infiltrates as above.    < end of copied text >    Imaging  Reviewed:  YES/NO    Consultant(s) Notes Reviewed:   YES/ No      Plan of care was discussed with patient and /or primary care giver; all questions and concerns were addressed

## 2021-08-31 NOTE — CONSULT NOTE ADULT - ASSESSMENT
1. PNA 2nd to Covid-19  - PCR positive.   - CXR noted.  - Continue Vit C, Vit D, Zinc   - Started Remdesivir   - Continue Dexamethasone   - Continue Singulair and Pepcid   - Robitussin PRN for cough  - Tylenol PRN for temp   - O2 Supp - taper as tolerated by pt.   - Monitor O2 Sat.  - Monitor labs  - F.U CXR  - Prone positioning as tolerated   - Isolation precautions   - DVT and GI PPX

## 2021-09-01 LAB
ALBUMIN SERPL ELPH-MCNC: 2.9 G/DL — LOW (ref 3.5–5)
ALP SERPL-CCNC: 35 U/L — LOW (ref 40–120)
ALT FLD-CCNC: 23 U/L DA — SIGNIFICANT CHANGE UP (ref 10–60)
ANION GAP SERPL CALC-SCNC: 6 MMOL/L — SIGNIFICANT CHANGE UP (ref 5–17)
AST SERPL-CCNC: 12 U/L — SIGNIFICANT CHANGE UP (ref 10–40)
BILIRUB SERPL-MCNC: 0.3 MG/DL — SIGNIFICANT CHANGE UP (ref 0.2–1.2)
BUN SERPL-MCNC: 13 MG/DL — SIGNIFICANT CHANGE UP (ref 7–18)
CALCIUM SERPL-MCNC: 8.8 MG/DL — SIGNIFICANT CHANGE UP (ref 8.4–10.5)
CHLORIDE SERPL-SCNC: 107 MMOL/L — SIGNIFICANT CHANGE UP (ref 96–108)
CO2 SERPL-SCNC: 28 MMOL/L — SIGNIFICANT CHANGE UP (ref 22–31)
CREAT SERPL-MCNC: 0.69 MG/DL — SIGNIFICANT CHANGE UP (ref 0.5–1.3)
GLUCOSE BLDC GLUCOMTR-MCNC: 112 MG/DL — HIGH (ref 70–99)
GLUCOSE BLDC GLUCOMTR-MCNC: 122 MG/DL — HIGH (ref 70–99)
GLUCOSE BLDC GLUCOMTR-MCNC: 160 MG/DL — HIGH (ref 70–99)
GLUCOSE BLDC GLUCOMTR-MCNC: 164 MG/DL — HIGH (ref 70–99)
GLUCOSE SERPL-MCNC: 118 MG/DL — HIGH (ref 70–99)
HCT VFR BLD CALC: 42.4 % — SIGNIFICANT CHANGE UP (ref 39–50)
HGB BLD-MCNC: 14.7 G/DL — SIGNIFICANT CHANGE UP (ref 13–17)
MAGNESIUM SERPL-MCNC: 2.5 MG/DL — SIGNIFICANT CHANGE UP (ref 1.6–2.6)
MCHC RBC-ENTMCNC: 30.4 PG — SIGNIFICANT CHANGE UP (ref 27–34)
MCHC RBC-ENTMCNC: 34.7 GM/DL — SIGNIFICANT CHANGE UP (ref 32–36)
MCV RBC AUTO: 87.6 FL — SIGNIFICANT CHANGE UP (ref 80–100)
NRBC # BLD: 0 /100 WBCS — SIGNIFICANT CHANGE UP (ref 0–0)
PHOSPHATE SERPL-MCNC: 3.3 MG/DL — SIGNIFICANT CHANGE UP (ref 2.5–4.5)
PLATELET # BLD AUTO: 235 K/UL — SIGNIFICANT CHANGE UP (ref 150–400)
POTASSIUM SERPL-MCNC: 4 MMOL/L — SIGNIFICANT CHANGE UP (ref 3.5–5.3)
POTASSIUM SERPL-SCNC: 4 MMOL/L — SIGNIFICANT CHANGE UP (ref 3.5–5.3)
PROT SERPL-MCNC: 6.9 G/DL — SIGNIFICANT CHANGE UP (ref 6–8.3)
RBC # BLD: 4.84 M/UL — SIGNIFICANT CHANGE UP (ref 4.2–5.8)
RBC # FLD: 12.3 % — SIGNIFICANT CHANGE UP (ref 10.3–14.5)
SODIUM SERPL-SCNC: 141 MMOL/L — SIGNIFICANT CHANGE UP (ref 135–145)
WBC # BLD: 7.28 K/UL — SIGNIFICANT CHANGE UP (ref 3.8–10.5)
WBC # FLD AUTO: 7.28 K/UL — SIGNIFICANT CHANGE UP (ref 3.8–10.5)

## 2021-09-01 RX ORDER — SODIUM CHLORIDE 9 MG/ML
1000 INJECTION INTRAMUSCULAR; INTRAVENOUS; SUBCUTANEOUS ONCE
Refills: 0 | Status: COMPLETED | OUTPATIENT
Start: 2021-09-01 | End: 2021-09-01

## 2021-09-01 RX ADMIN — Medication 1000 MILLIGRAM(S): at 11:45

## 2021-09-01 RX ADMIN — ZINC SULFATE TAB 220 MG (50 MG ZINC EQUIVALENT) 220 MILLIGRAM(S): 220 (50 ZN) TAB at 11:46

## 2021-09-01 RX ADMIN — Medication 6 MILLIGRAM(S): at 06:28

## 2021-09-01 RX ADMIN — Medication 1: at 21:12

## 2021-09-01 RX ADMIN — ENOXAPARIN SODIUM 40 MILLIGRAM(S): 100 INJECTION SUBCUTANEOUS at 11:46

## 2021-09-01 RX ADMIN — REMDESIVIR 500 MILLIGRAM(S): 5 INJECTION INTRAVENOUS at 21:06

## 2021-09-01 RX ADMIN — Medication 2000 UNIT(S): at 11:46

## 2021-09-01 RX ADMIN — PANTOPRAZOLE SODIUM 40 MILLIGRAM(S): 20 TABLET, DELAYED RELEASE ORAL at 06:27

## 2021-09-01 RX ADMIN — Medication 1: at 17:10

## 2021-09-01 RX ADMIN — MONTELUKAST 10 MILLIGRAM(S): 4 TABLET, CHEWABLE ORAL at 21:06

## 2021-09-01 RX ADMIN — SODIUM CHLORIDE 1000 MILLILITER(S): 9 INJECTION INTRAMUSCULAR; INTRAVENOUS; SUBCUTANEOUS at 09:19

## 2021-09-01 NOTE — DISCHARGE NOTE PROVIDER - HOSPITAL COURSE
Patient is a 30 year old male, , unvaccinated for COVID 19, with no significant PMH, presented to ED with fevers, myalgias, cough, SOB. In ED, CXR showed bilateral infiltrates PNA, SPO2 94% on RA, requiring O2 supplementation with NC. COVID 19 nucleocapsid CARLOS MANUEL AB is positive. Patient admitted to medicine for positive COVID PNA. Was started on Remdesivir and decadron.       >>>>>>>>>>>>>>>>>>>>>>>>>>>>>>>>>>>>>>>>>>>INCOMPLETE>>>>>>>>>>>>>>>>>>>>>>>>>>>>>>>>>>>>>>>>>>>>>>>>> 30 year old male, , no significant PMH. Patient presented to ED with fevers, myalgias, cough, SOB. Bilateral infiltrates on CXR. Patient admitted to medicine for COVID PNA.  Patient started on Remdesivir and decadron. Intermittently  required supplemental oxygen then transitioned off. BP trended  down,  s/p IVF bolus  with improvement   Clinically improving, optimized for discharge with outpt follow up  Please note that this a brief summary of hospital course please refer to daily progress notes and consult notes for full course and events     30 year old male, , no significant PMH. Patient presented to ED with fevers, myalgias, cough, SOB. Bilateral infiltrates on CXR. Patient admitted to medicine for COVID PNA.  Patient started on Remdesivir and decadron. Intermittently  required supplemental oxygen then transitioned off. BP trended  down,  s/p IVF bolus  with improvement  Clinically improving, optimized for discharge with outpt follow up  plan to d/c on Xarelto for covid VTE ppx given pt's sedentary work, pt is   pt has no insurance, medications delivered [facility] medication indication/compliance discussed with pt at length with good understanding.   Please note that this a brief summary of hospital course please refer to daily progress notes and consult notes for full course and events

## 2021-09-01 NOTE — DISCHARGE NOTE PROVIDER - NSDCCPCAREPLAN_GEN_ALL_CORE_FT
PRINCIPAL DISCHARGE DIAGNOSIS  Diagnosis: Pneumonia due to COVID-19 virus  Assessment and Plan of Treatment: You were found to have pneumonia due to Covid 19. You required oxygen. You were treated with Remdesivir and steroids Please maintain quarentine for total of 14 days. Seek immediate medical attention if you develop shortness of breath. See additional instructions below.   CORONAVIRUS INSTRUCTIONS:   Based on your current clinical status and stability, it has been determined that you no longer need hospitalization and can recover while remaining in self-quarantine at home. You should follow the prevention steps below until a healthcare provider or local or state health department says you can return to your normal activities.   1. You should restrict activities outside your home, except for getting medical care.   2. Do not go to work, school, or public areas.   3. Avoid using public transportation, ride-sharing, or taxis.   4. Separate yourself from other people and animals in your home as much as possible.  When you are around other people (e.g., sharing a room or vehicle) you should wear a facemask.  5. Wash your hands often with soap and water for at least 20 seconds, especially after blowing your nose, coughing, or sneezing; going to the bathroom; and before eating or preparing food.  6. Cover your mouth and nose with a tissue when you cough or sneeze. Throw used tissues in a lined trash can. Immediately wash your hands with soap and water for at least 20 seconds  7. High touch surfaces include counters, tabletops, doorknobs, bathroom fixtures, toilets, phones, keyboards, tablets, and bedside tables.  8. Avoid sharing dishes, drinking glasses, cups, eating utensils, towels, or bedding with other people or pets in your home. After using these items, they should be washed thoroughly with soap and water.  You are strongly advised to seek prompt medical attention if your illness worsens or you develop new symptoms like fever or difficulty breathing.   Please call  [phone] to speak with your discharging physician if you have any questions.        SECONDARY DISCHARGE DIAGNOSES  Diagnosis: Abnormal TSH  Assessment and Plan of Treatment: your TSH level was noted to be high  follow up with PCP outpatient for further follow up and recommendations    Diagnosis: 2019 novel coronavirus disease (COVID-19)  Assessment and Plan of Treatment: refer to principle diagnosis for further information     PRINCIPAL DISCHARGE DIAGNOSIS  Diagnosis: Pneumonia due to COVID-19 virus  Assessment and Plan of Treatment: You were found to have pneumonia due to Covid 19. You required oxygen. You were treated with Remdesivir and steroids Please maintain quarentine for total of 14 days.   CORONAVIRUS INSTRUCTIONS:   Based on your current clinical status and stability, it has been determined that you no longer need hospitalization and can recover while remaining in self-quarantine at home. You should follow the prevention steps below until a healthcare provider or local or state health department says you can return to your normal activities.   1. You should restrict activities outside your home, except for getting medical care.   2. Do not go to work, school, or public areas.   3. Avoid using public transportation, ride-sharing, or taxis.   4. Separate yourself from other people and animals in your home as much as possible.  When you are around other people (e.g., sharing a room or vehicle) you should wear a facemask.  5. Wash your hands often with soap and water for at least 20 seconds, especially after blowing your nose, coughing, or sneezing; going to the bathroom; and before eating or preparing food.  6. Cover your mouth and nose with a tissue when you cough or sneeze. Throw used tissues in a lined trash can. Immediately wash your hands with soap and water for at least 20 seconds  7. High touch surfaces include counters, tabletops, doorknobs, bathroom fixtures, toilets, phones, keyboards, tablets, and bedside tables.  8. Avoid sharing dishes, drinking glasses, cups, eating utensils, towels, or bedding with other people or pets in your home. After using these items, they should be washed thoroughly with soap and water.  You are strongly advised to seek prompt medical attention if your illness worsens or you develop new symptoms like fever or difficulty breathing.  Due to COVID infection and your Sedentary work you are at higher risk for developing blood clots, you are prescribed blood thinner Xarelto take as prescribed   Follow up with PCP within 1-2 weeks      SECONDARY DISCHARGE DIAGNOSES  Diagnosis: Abnormal TSH  Assessment and Plan of Treatment: your TSH level was noted to be high  follow up with PCP outpatient for further follow up and recommendations    Diagnosis: 2019 novel coronavirus disease (COVID-19)  Assessment and Plan of Treatment: refer to principle diagnosis for further information

## 2021-09-01 NOTE — DISCHARGE NOTE PROVIDER - NSDCMRMEDTOKEN_GEN_ALL_CORE_FT
acetaminophen 325 mg oral tablet: 2 tab(s) orally every 4 hours, As needed, Temp greater or equal to 38.5C (101.3F)  albuterol 90 mcg/inh inhalation aerosol: 2 puff(s) inhaled every 4 hours, As needed, Shortness of Breath and/or Wheezing  ascorbic acid 1000 mg oral tablet: 1 tab(s) orally once a day   cholecalciferol oral tablet: 2000 unit(s) orally once a day  dexamethasone 6 mg oral tablet: 1 tab(s) orally once a day   montelukast 10 mg oral tablet: 1 tab(s) orally once a day (at bedtime)  Xarelto 10 mg oral tablet: 1 tab(s) orally once a day   zinc sulfate 220 mg oral capsule: 1 cap(s) orally once a day   acetaminophen 325 mg oral tablet: 2 tab(s) orally every 4 hours, As needed, Temp greater or equal to 38.5C (101.3F)  albuterol 90 mcg/inh inhalation aerosol: 2 puff(s) inhaled every 6 hours, As Needed -Shortness of Breath and/or Wheezing   ascorbic acid 1000 mg oral tablet: 1 tab(s) orally once a day   cholecalciferol oral tablet: 2000 unit(s) orally once a day  dexamethasone 6 mg oral tablet: 1 tab(s) orally once a day   montelukast 10 mg oral tablet: 1 tab(s) orally once a day (at bedtime)  Xarelto 10 mg oral tablet: 1 tab(s) orally once a day   zinc sulfate 220 mg oral capsule: 1 cap(s) orally once a day

## 2021-09-01 NOTE — PROGRESS NOTE ADULT - SUBJECTIVE AND OBJECTIVE BOX
Patient is a 30y old  Male who presents with a chief complaint of COVID-19 (31 Aug 2021 13:19)    pt seen in icu [  ], reg med floor [  x ], bed [ x ], chair at bedside [   ], a+o x3 [ x ], lethargic [  ],  nad [ x ]      Allergies    No Known Allergies        Vitals    T(F): 97 (09-01-21 @ 04:50), Max: 98.4 (08-31-21 @ 12:21)  HR: 67 (09-01-21 @ 04:50) (67 - 74)  BP: 90/54 (09-01-21 @ 04:50) (90/54 - 98/64)  RR: 17 (09-01-21 @ 04:50) (17 - 17)  SpO2: 98% (09-01-21 @ 04:50) (97% - 100%)  Wt(kg): --  CAPILLARY BLOOD GLUCOSE      POCT Blood Glucose.: 112 mg/dL (01 Sep 2021 08:06)      Labs                          14.7   7.28  )-----------( 235      ( 01 Sep 2021 08:37 )             42.4       08-31    140  |  106  |  10  ----------------------------<  119<H>  4.2   |  28  |  0.71    Ca    8.5      31 Aug 2021 08:12  Phos  3.2     08-31  Mg     2.3     08-31    TPro  7.7  /  Alb  3.4<L>  /  TBili  0.4  /  DBili  x   /  AST  18  /  ALT  30  /  AlkPhos  44  08-31      CARDIAC MARKERS ( 30 Aug 2021 16:11 )  <0.015 ng/mL / x     / x     / x     / x        COVID-19 Daryn Domain Antibody (08.31.21 @ 13:38)   COVID-19 Daryn Domain AB Interp: Negative:     COVID-19 Nucleocapsid Antibody (08.31.21 @ 13:38)   COVID-19 Nucleocapsid CARLOS MANUEL AB Interp: Positive    Procalcitonin, Serum (08.31.21 @ 13:44)   Procalcitonin, Serum: 0.03:  C-Reactive Protein, Serum (08.31.21 @ 13:44)   C-Reactive Protein, Serum: 21  D-Dimer Assay, Quantitative (08.31.21 @ 08:12)   D-Dimer Assay, Quantitative: 207   Ferritin, Serum (08.31.21 @ 13:43)   Ferritin, Serum: 38  Lactate Dehydrogenase, Serum (08.30.21 @ 16:11)   Lactate Dehydrogenase, Serum: 288        Radiology Results      Meds    MEDICATIONS  (STANDING):  ascorbic acid 1000 milliGRAM(s) Oral daily  cholecalciferol 2000 Unit(s) Oral daily  dexAMETHasone  Injectable 6 milliGRAM(s) IV Push daily  enoxaparin Injectable 40 milliGRAM(s) SubCutaneous daily  insulin lispro (ADMELOG) corrective regimen sliding scale   SubCutaneous Before meals and at bedtime  montelukast 10 milliGRAM(s) Oral at bedtime  pantoprazole    Tablet 40 milliGRAM(s) Oral before breakfast  remdesivir  IVPB   IV Intermittent   remdesivir  IVPB 100 milliGRAM(s) IV Intermittent every 24 hours  zinc sulfate 220 milliGRAM(s) Oral daily      MEDICATIONS  (PRN):  acetaminophen   Tablet .. 650 milliGRAM(s) Oral every 4 hours PRN Temp greater or equal to 38.5C (101.3F)  ALBUTerol    90 MICROgram(s) HFA Inhaler 2 Puff(s) Inhalation every 4 hours PRN Shortness of Breath and/or Wheezing      Physical Exam    Neuro :  no focal deficits  Respiratory: CTA B/L  CV: RRR, S1S2, no murmurs,   Abdominal: Soft, NT, ND +BS,  Extremities: No edema, + peripheral pulses      ASSESSMENT    sepsis,   pneumonia 2nd to covid 19  H/O bilateral inguinal hernia repair        PLAN    d/c rocephin, zithromax given procal neg,   cont decadron,   cont remdesivir x 5 days  cont vit c, vitamin d, zinc, pepcid, singulair,   contact and airborne isolation,   pulm f/u  cont albuterol inhaler,   tmx 98.4  cont tylenol prn,   cont robitussin prn   O2 sat 98% (97% - 100%) ra  cont O2 via nasal canula if needed,   cont lovenox  covid-19 antibody test nucleocapsid positive  covid-19 antibody test spike domain neg    D-dimer, crp, ldh, ferritin, lactate, procalcitonin noted above   ivf ns bolus 1000 ml for hypotension  cont current meds     Patient is a 30y old  Male who presents with a chief complaint of COVID-19 (31 Aug 2021 13:19)    pt seen in icu [  ], reg med floor [  x ], bed [ x ], chair at bedside [   ], a+o x3 [ x ], lethargic [  ],  nad [ x ]      Allergies    No Known Allergies        Vitals    T(F): 97 (09-01-21 @ 04:50), Max: 98.4 (08-31-21 @ 12:21)  HR: 67 (09-01-21 @ 04:50) (67 - 74)  BP: 90/54 (09-01-21 @ 04:50) (90/54 - 98/64)  RR: 17 (09-01-21 @ 04:50) (17 - 17)  SpO2: 98% (09-01-21 @ 04:50) (97% - 100%)  Wt(kg): --  CAPILLARY BLOOD GLUCOSE      POCT Blood Glucose.: 112 mg/dL (01 Sep 2021 08:06)      Labs                          14.7   7.28  )-----------( 235      ( 01 Sep 2021 08:37 )             42.4       08-31    140  |  106  |  10  ----------------------------<  119<H>  4.2   |  28  |  0.71    Ca    8.5      31 Aug 2021 08:12  Phos  3.2     08-31  Mg     2.3     08-31    TPro  7.7  /  Alb  3.4<L>  /  TBili  0.4  /  DBili  x   /  AST  18  /  ALT  30  /  AlkPhos  44  08-31      CARDIAC MARKERS ( 30 Aug 2021 16:11 )  <0.015 ng/mL / x     / x     / x     / x        COVID-19 Daryn Domain Antibody (08.31.21 @ 13:38)   COVID-19 Daryn Domain AB Interp: Negative:     COVID-19 Nucleocapsid Antibody (08.31.21 @ 13:38)   COVID-19 Nucleocapsid CARLOS MANUEL AB Interp: Positive    Procalcitonin, Serum (08.31.21 @ 13:44)   Procalcitonin, Serum: 0.03:  C-Reactive Protein, Serum (08.31.21 @ 13:44)   C-Reactive Protein, Serum: 21  D-Dimer Assay, Quantitative (08.31.21 @ 08:12)   D-Dimer Assay, Quantitative: 207   Ferritin, Serum (08.31.21 @ 13:43)   Ferritin, Serum: 38  Lactate Dehydrogenase, Serum (08.30.21 @ 16:11)   Lactate Dehydrogenase, Serum: 288        Radiology Results      Meds    MEDICATIONS  (STANDING):  ascorbic acid 1000 milliGRAM(s) Oral daily  cholecalciferol 2000 Unit(s) Oral daily  dexAMETHasone  Injectable 6 milliGRAM(s) IV Push daily  enoxaparin Injectable 40 milliGRAM(s) SubCutaneous daily  insulin lispro (ADMELOG) corrective regimen sliding scale   SubCutaneous Before meals and at bedtime  montelukast 10 milliGRAM(s) Oral at bedtime  pantoprazole    Tablet 40 milliGRAM(s) Oral before breakfast  remdesivir  IVPB   IV Intermittent   remdesivir  IVPB 100 milliGRAM(s) IV Intermittent every 24 hours  zinc sulfate 220 milliGRAM(s) Oral daily      MEDICATIONS  (PRN):  acetaminophen   Tablet .. 650 milliGRAM(s) Oral every 4 hours PRN Temp greater or equal to 38.5C (101.3F)  ALBUTerol    90 MICROgram(s) HFA Inhaler 2 Puff(s) Inhalation every 4 hours PRN Shortness of Breath and/or Wheezing      Physical Exam    Neuro :  no focal deficits  Respiratory: CTA B/L  CV: RRR, S1S2, no murmurs,   Abdominal: Soft, NT, ND +BS,  Extremities: No edema, + peripheral pulses      ASSESSMENT    sepsis,   pneumonia 2nd to covid 19  H/O bilateral inguinal hernia repair        PLAN    d/c rocephin, zithromax given procal neg,   cont decadron,   cont remdesivir x 5 days  cont vit c, vitamin d, zinc, pepcid, singulair,   contact and airborne isolation,   pulm f/u  cont albuterol inhaler,   tmx 98.4  cont tylenol prn,   cont robitussin prn   O2 sat 98% (97% - 100%) ra   cont O2 via nasal canula if needed,   cont lovenox  covid-19 antibody test nucleocapsid positive  covid-19 antibody test spike domain neg    D-dimer, crp, ldh, ferritin, lactate, procalcitonin noted above   ivf ns bolus 1000 ml for hypotension  cont current meds

## 2021-09-01 NOTE — PROGRESS NOTE ADULT - PROBLEM SELECTOR PLAN 1
SpO2 98% 2LNC  CXR shows bilateral infiltrates   c/w remdsivir and decadron.   f/u COVID inflammatory markers   f/u procalcitonin  ID Dr. Weiss SpO2 98% RA at rest  CXR shows bilateral infiltrates   c/w remdsivir and decadron.   f/u COVID inflammatory markers   procalcitonin normal

## 2021-09-01 NOTE — PROGRESS NOTE ADULT - SUBJECTIVE AND OBJECTIVE BOX
NP Note discussed with  Primary Attending    Patient is a 30y old  Male who presents with a chief complaint of COVID-19 (01 Sep 2021 10:04)      INTERVAL HPI/OVERNIGHT EVENTS: Patient seen and examined at bedside, no new complaints    MEDICATIONS  (STANDING):  ascorbic acid 1000 milliGRAM(s) Oral daily  cholecalciferol 2000 Unit(s) Oral daily  dexAMETHasone  Injectable 6 milliGRAM(s) IV Push daily  enoxaparin Injectable 40 milliGRAM(s) SubCutaneous daily  insulin lispro (ADMELOG) corrective regimen sliding scale   SubCutaneous Before meals and at bedtime  montelukast 10 milliGRAM(s) Oral at bedtime  pantoprazole    Tablet 40 milliGRAM(s) Oral before breakfast  remdesivir  IVPB   IV Intermittent   remdesivir  IVPB 100 milliGRAM(s) IV Intermittent every 24 hours  zinc sulfate 220 milliGRAM(s) Oral daily    MEDICATIONS  (PRN):  acetaminophen   Tablet .. 650 milliGRAM(s) Oral every 4 hours PRN Temp greater or equal to 38.5C (101.3F)  ALBUTerol    90 MICROgram(s) HFA Inhaler 2 Puff(s) Inhalation every 4 hours PRN Shortness of Breath and/or Wheezing      __________________________________________________  REVIEW OF SYSTEMS:    CONSTITUTIONAL: No fever,   EYES: no acute visual disturbances  NECK: No pain or stiffness  RESPIRATORY: No cough; No shortness of breath  CARDIOVASCULAR: No chest pain, no palpitations  GASTROINTESTINAL: No pain. No nausea or vomiting; No diarrhea   NEUROLOGICAL: No headache or numbness, no tremors  MUSCULOSKELETAL: No joint pain, no muscle pain  GENITOURINARY: no dysuria, no frequency, no hesitancy  PSYCHIATRY: no depression , no anxiety  ALL OTHER  ROS negative        Vital Signs Last 24 Hrs  T(C): 36.1 (01 Sep 2021 11:56), Max: 36.2 (31 Aug 2021 20:54)  T(F): 97 (01 Sep 2021 11:56), Max: 97.2 (31 Aug 2021 20:54)  HR: 62 (01 Sep 2021 11:56) (62 - 71)  BP: 94/62 (01 Sep 2021 11:56) (90/54 - 94/62)  BP(mean): --  RR: 16 (01 Sep 2021 11:56) (16 - 17)  SpO2: 98% (01 Sep 2021 11:56) (97% - 98%)    ________________________________________________  PHYSICAL EXAM:  GENERAL: NAD  HEENT: Normocephalic;  conjunctivae and sclerae clear; moist mucous membranes;   NECK : supple  CHEST/LUNG: Clear to auscultation bilaterally with good air entry   HEART: S1 S2  regular; no murmurs, gallops or rubs  ABDOMEN: Soft, Nontender, Nondistended; Bowel sounds present  EXTREMITIES: no cyanosis; no edema; no calf tenderness  SKIN: warm and dry; no rash  NERVOUS SYSTEM:  Awake and alert; Oriented  to place, person and time ; no new deficits    _________________________________________________  LABS:                        14.7   7.28  )-----------( 235      ( 01 Sep 2021 08:37 )             42.4     09-01    141  |  107  |  13  ----------------------------<  118<H>  4.0   |  28  |  0.69    Ca    8.8      01 Sep 2021 08:37  Phos  3.3     09-01  Mg     2.5     09-01    TPro  6.9  /  Alb  2.9<L>  /  TBili  0.3  /  DBili  x   /  AST  12  /  ALT  23  /  AlkPhos  35<L>  09-01        CAPILLARY BLOOD GLUCOSE      POCT Blood Glucose.: 122 mg/dL (01 Sep 2021 11:46)  POCT Blood Glucose.: 112 mg/dL (01 Sep 2021 08:06)  POCT Blood Glucose.: 130 mg/dL (31 Aug 2021 21:03)  POCT Blood Glucose.: 161 mg/dL (31 Aug 2021 16:40)        RADIOLOGY & ADDITIONAL TESTS:  < from: Xray Chest 1 View- PORTABLE-Urgent (08.30.21 @ 15:46) >    EXAM:  XR CHEST PORTABLE URGENT 1V                            PROCEDURE DATE:  08/30/2021          INTERPRETATION:  AP erect chest on August 30, 2021 at 3:40 PM. Patient is short of breath with cough and fever.    COMPARISON: None available.    Heart size is within normal limits.    There is slight scattered minimal lower lung field infiltrates mainly at the lung bases right greater than left. These findings are consistent with Covid Pneumonia.    IMPRESSION: Bilateral infiltrates as above.    < end of copied text >    Imaging  Reviewed:  YES/NO    Consultant(s) Notes Reviewed:   YES/ No      Plan of care was discussed with patient and /or primary care giver; all questions and concerns were addressed

## 2021-09-01 NOTE — PROGRESS NOTE ADULT - SUBJECTIVE AND OBJECTIVE BOX
Patient is a 30y old  Male who presents with a chief complaint of COVID-19 (01 Sep 2021 09:02)  Awake, alert, comfortable in bed in NAD. No cough or sob at rest.    INTERVAL HPI/OVERNIGHT EVENTS:      VITAL SIGNS:  T(F): 97 (09-01-21 @ 04:50)  HR: 67 (09-01-21 @ 04:50)  BP: 90/54 (09-01-21 @ 04:50)  RR: 17 (09-01-21 @ 04:50)  SpO2: 98% (09-01-21 @ 04:50)  Wt(kg): --  I&O's Detail          REVIEW OF SYSTEMS:    CONSTITUTIONAL:  No fevers, chills, sweats    HEENT:  Eyes:  No diplopia or blurred vision. ENT:  No earache, sore throat or runny nose.    CARDIOVASCULAR:  No pressure, squeezing, tightness, or heaviness about the chest; no palpitations.    RESPIRATORY:  Per HPI    GASTROINTESTINAL:  No abdominal pain, nausea, vomiting or diarrhea.    GENITOURINARY:  No dysuria, frequency or urgency.    NEUROLOGIC:  No paresthesias, fasciculations, seizures or weakness.    PSYCHIATRIC:  No disorder of thought or mood.      PHYSICAL EXAM:    Constitutional: Well developed and nourished  Eyes:Perrla  ENMT: normal  Neck:supple  Respiratory: good air entry  Cardiovascular: S1 S2 regular  Gastrointestinal: Soft, Non tender  Extremities: No edema  Vascular:normal  Neurological:Awake, alert,Ox3  Musculoskeletal:Normal      MEDICATIONS  (STANDING):  ascorbic acid 1000 milliGRAM(s) Oral daily  cholecalciferol 2000 Unit(s) Oral daily  dexAMETHasone  Injectable 6 milliGRAM(s) IV Push daily  enoxaparin Injectable 40 milliGRAM(s) SubCutaneous daily  insulin lispro (ADMELOG) corrective regimen sliding scale   SubCutaneous Before meals and at bedtime  montelukast 10 milliGRAM(s) Oral at bedtime  pantoprazole    Tablet 40 milliGRAM(s) Oral before breakfast  remdesivir  IVPB   IV Intermittent   remdesivir  IVPB 100 milliGRAM(s) IV Intermittent every 24 hours  zinc sulfate 220 milliGRAM(s) Oral daily    MEDICATIONS  (PRN):  acetaminophen   Tablet .. 650 milliGRAM(s) Oral every 4 hours PRN Temp greater or equal to 38.5C (101.3F)  ALBUTerol    90 MICROgram(s) HFA Inhaler 2 Puff(s) Inhalation every 4 hours PRN Shortness of Breath and/or Wheezing      Allergies    No Known Allergies    Intolerances        LABS:                        14.7   7.28  )-----------( 235      ( 01 Sep 2021 08:37 )             42.4     09-01    141  |  107  |  13  ----------------------------<  118<H>  4.0   |  28  |  0.69    Ca    8.8      01 Sep 2021 08:37  Phos  3.3     09-01  Mg     2.5     09-01    TPro  6.9  /  Alb  2.9<L>  /  TBili  0.3  /  DBili  x   /  AST  12  /  ALT  23  /  AlkPhos  35<L>  09-01          CARDIAC MARKERS ( 30 Aug 2021 16:11 )  <0.015 ng/mL / x     / x     / x     / x          CAPILLARY BLOOD GLUCOSE      POCT Blood Glucose.: 112 mg/dL (01 Sep 2021 08:06)  POCT Blood Glucose.: 130 mg/dL (31 Aug 2021 21:03)  POCT Blood Glucose.: 161 mg/dL (31 Aug 2021 16:40)  POCT Blood Glucose.: 156 mg/dL (31 Aug 2021 11:33)    pro-bnp -- 08-31 @ 08:12     d-dimer 207  08-31 @ 08:12  pro-bnp -- 08-30 @ 16:12     d-dimer 442  08-30 @ 16:12      RADIOLOGY & ADDITIONAL TESTS:    CXR:  < from: Xray Chest 1 View- PORTABLE-Urgent (08.30.21 @ 15:46) >  IMPRESSION: Bilateral infiltrates as above.    < end of copied text >    Ct scan chest:    ekg;    echo:

## 2021-09-01 NOTE — PROGRESS NOTE ADULT - PROBLEM SELECTOR PLAN 5
pending ID recommendations  remdesivir and decadron therapy  monitor SpO2 off and on oxygen remdesivir and decadron therapy  monitor SpO2 off and on oxygen

## 2021-09-01 NOTE — DISCHARGE NOTE PROVIDER - CARE PROVIDER_API CALL
Alysia Huerta (DO)  Family Medicine  [address], [address] [place] [address]  Phone: [phone]  Fax: [phone]  Follow Up Time: 2 weeks

## 2021-09-02 DIAGNOSIS — J18.9 PNEUMONIA, UNSPECIFIED ORGANISM: ICD-10-CM

## 2021-09-02 LAB
GLUCOSE BLDC GLUCOMTR-MCNC: 101 MG/DL — HIGH (ref 70–99)
GLUCOSE BLDC GLUCOMTR-MCNC: 109 MG/DL — HIGH (ref 70–99)
GLUCOSE BLDC GLUCOMTR-MCNC: 160 MG/DL — HIGH (ref 70–99)
GLUCOSE BLDC GLUCOMTR-MCNC: 221 MG/DL — HIGH (ref 70–99)

## 2021-09-02 RX ORDER — CHOLECALCIFEROL (VITAMIN D3) 125 MCG
2000 CAPSULE ORAL
Qty: 14 | Refills: 0
Start: 2021-09-02 | End: 2021-09-15

## 2021-09-02 RX ORDER — ZINC SULFATE TAB 220 MG (50 MG ZINC EQUIVALENT) 220 (50 ZN) MG
1 TAB ORAL
Qty: 14 | Refills: 0
Start: 2021-09-02 | End: 2021-09-15

## 2021-09-02 RX ORDER — RIVAROXABAN 15 MG-20MG
1 KIT ORAL
Qty: 30 | Refills: 0
Start: 2021-09-02 | End: 2021-10-01

## 2021-09-02 RX ORDER — ALBUTEROL 90 UG/1
2 AEROSOL, METERED ORAL
Qty: 112 | Refills: 0
Start: 2021-09-02 | End: 2021-09-15

## 2021-09-02 RX ORDER — ACETAMINOPHEN 500 MG
2 TABLET ORAL
Qty: 0 | Refills: 0 | DISCHARGE
Start: 2021-09-02

## 2021-09-02 RX ORDER — SODIUM CHLORIDE 9 MG/ML
1000 INJECTION INTRAMUSCULAR; INTRAVENOUS; SUBCUTANEOUS ONCE
Refills: 0 | Status: COMPLETED | OUTPATIENT
Start: 2021-09-02 | End: 2021-09-02

## 2021-09-02 RX ORDER — DEXAMETHASONE 0.5 MG/5ML
1 ELIXIR ORAL
Qty: 6 | Refills: 0
Start: 2021-09-02 | End: 2021-09-07

## 2021-09-02 RX ORDER — MONTELUKAST 4 MG/1
1 TABLET, CHEWABLE ORAL
Qty: 14 | Refills: 0
Start: 2021-09-02 | End: 2021-09-15

## 2021-09-02 RX ORDER — ASCORBIC ACID 60 MG
1 TABLET,CHEWABLE ORAL
Qty: 14 | Refills: 0
Start: 2021-09-02 | End: 2021-09-15

## 2021-09-02 RX ORDER — ALBUTEROL 90 UG/1
2 AEROSOL, METERED ORAL
Qty: 1 | Refills: 0
Start: 2021-09-02 | End: 2021-09-15

## 2021-09-02 RX ADMIN — MONTELUKAST 10 MILLIGRAM(S): 4 TABLET, CHEWABLE ORAL at 21:04

## 2021-09-02 RX ADMIN — ZINC SULFATE TAB 220 MG (50 MG ZINC EQUIVALENT) 220 MILLIGRAM(S): 220 (50 ZN) TAB at 11:59

## 2021-09-02 RX ADMIN — Medication 2: at 11:59

## 2021-09-02 RX ADMIN — Medication 6 MILLIGRAM(S): at 07:00

## 2021-09-02 RX ADMIN — Medication 2000 UNIT(S): at 11:59

## 2021-09-02 RX ADMIN — Medication 1: at 21:04

## 2021-09-02 RX ADMIN — ENOXAPARIN SODIUM 40 MILLIGRAM(S): 100 INJECTION SUBCUTANEOUS at 12:01

## 2021-09-02 RX ADMIN — REMDESIVIR 500 MILLIGRAM(S): 5 INJECTION INTRAVENOUS at 20:15

## 2021-09-02 RX ADMIN — Medication 1000 MILLIGRAM(S): at 12:00

## 2021-09-02 RX ADMIN — PANTOPRAZOLE SODIUM 40 MILLIGRAM(S): 20 TABLET, DELAYED RELEASE ORAL at 07:00

## 2021-09-02 RX ADMIN — SODIUM CHLORIDE 1000 MILLILITER(S): 9 INJECTION INTRAMUSCULAR; INTRAVENOUS; SUBCUTANEOUS at 08:36

## 2021-09-02 NOTE — PROGRESS NOTE ADULT - PROBLEM SELECTOR PLAN 1
-b/l PNA in setting of COVID 19  -cont Remdesivir 4/5  -cont Dexamethasone   -titrated off of supplemental oxygen   -airborne and contact isolation   -supportive measures

## 2021-09-02 NOTE — PROGRESS NOTE ADULT - SUBJECTIVE AND OBJECTIVE BOX
Pt is awake, alert, lying in bed in NAD, Sat 94%.     INTERVAL HPI/OVERNIGHT EVENTS:      VITAL SIGNS:  T(F): 97.4 (09-02-21 @ 05:55)  HR: 85 (09-02-21 @ 07:09)  BP: 87/57 (09-02-21 @ 07:09)  RR: 19 (09-02-21 @ 05:55)  SpO2: 94% (09-02-21 @ 05:55)  Wt(kg): --  I&O's Detail      REVIEW OF SYSTEMS:    CONSTITUTIONAL:  No fevers, chills, sweats    HEENT:  Eyes:  No diplopia or blurred vision. ENT:  No earache, sore throat or runny nose.    CARDIOVASCULAR:  No pressure, squeezing, tightness, or heaviness about the chest; no palpitations.    RESPIRATORY:  Per HPI    GASTROINTESTINAL:  No abdominal pain, nausea, vomiting or diarrhea.    GENITOURINARY:  No dysuria, frequency or urgency.    NEUROLOGIC:  No paresthesias, fasciculations, seizures or weakness.    PSYCHIATRIC:  No disorder of thought or mood.      PHYSICAL EXAM:    Constitutional: Well developed and nourished  Eyes: Perrla  ENMT: normal  Neck: supple  Respiratory: good air entry  Cardiovascular: S1 S2 regular  Gastrointestinal: Soft, Non tender  Extremities: No edema  Vascular: normal  Neurological: Awake, alert,Ox3  Musculoskeletal: Normal      MEDICATIONS  (STANDING):  ascorbic acid 1000 milliGRAM(s) Oral daily  cholecalciferol 2000 Unit(s) Oral daily  dexAMETHasone  Injectable 6 milliGRAM(s) IV Push daily  enoxaparin Injectable 40 milliGRAM(s) SubCutaneous daily  insulin lispro (ADMELOG) corrective regimen sliding scale   SubCutaneous Before meals and at bedtime  montelukast 10 milliGRAM(s) Oral at bedtime  pantoprazole    Tablet 40 milliGRAM(s) Oral before breakfast  remdesivir  IVPB   IV Intermittent   remdesivir  IVPB 100 milliGRAM(s) IV Intermittent every 24 hours  zinc sulfate 220 milliGRAM(s) Oral daily    MEDICATIONS  (PRN):  acetaminophen   Tablet .. 650 milliGRAM(s) Oral every 4 hours PRN Temp greater or equal to 38.5C (101.3F)  ALBUTerol    90 MICROgram(s) HFA Inhaler 2 Puff(s) Inhalation every 4 hours PRN Shortness of Breath and/or Wheezing      Allergies    No Known Allergies    Intolerances        LABS:                        14.7   7.28  )-----------( 235      ( 01 Sep 2021 08:37 )             42.4     09-01    141  |  107  |  13  ----------------------------<  118<H>  4.0   |  28  |  0.69    Ca    8.8      01 Sep 2021 08:37  Phos  3.3     09-01  Mg     2.5     09-01    TPro  6.9  /  Alb  2.9<L>  /  TBili  0.3  /  DBili  x   /  AST  12  /  ALT  23  /  AlkPhos  35<L>  09-01              CAPILLARY BLOOD GLUCOSE      POCT Blood Glucose.: 101 mg/dL (02 Sep 2021 08:12)  POCT Blood Glucose.: 160 mg/dL (01 Sep 2021 20:53)  POCT Blood Glucose.: 164 mg/dL (01 Sep 2021 16:43)  POCT Blood Glucose.: 122 mg/dL (01 Sep 2021 11:46)    pro-bnp -- 08-31 @ 08:12     d-dimer 207  08-31 @ 08:12  pro-bnp -- 08-30 @ 16:12     d-dimer 442  08-30 @ 16:12      RADIOLOGY & ADDITIONAL TESTS:    CXR:    Ct scan chest:    ekg;    echo:

## 2021-09-02 NOTE — PROGRESS NOTE ADULT - SUBJECTIVE AND OBJECTIVE BOX
Patient is a 30y old  Male who presents with a chief complaint of COVID-19 (01 Sep 2021 21:45)    pt seen in icu [  ], reg med floor [  x ], bed [ x ], chair at bedside [   ], a+o x3 [ x ], lethargic [  ],    nad [ x ]        Allergies    No Known Allergies        Vitals    T(F): 97.4 (09-02-21 @ 05:55), Max: 97.6 (09-01-21 @ 20:05)  HR: 86 (09-02-21 @ 05:55) (62 - 105)  BP: 89/51 (09-02-21 @ 05:55) (89/51 - 98/54)  RR: 19 (09-02-21 @ 05:55) (16 - 19)  SpO2: 94% (09-02-21 @ 05:55) (94% - 98%)  Wt(kg): --  CAPILLARY BLOOD GLUCOSE      POCT Blood Glucose.: 160 mg/dL (01 Sep 2021 20:53)      Labs                          14.7   7.28  )-----------( 235      ( 01 Sep 2021 08:37 )             42.4       09-01    141  |  107  |  13  ----------------------------<  118<H>  4.0   |  28  |  0.69    Ca    8.8      01 Sep 2021 08:37  Phos  3.3     09-01  Mg     2.5     09-01    TPro  6.9  /  Alb  2.9<L>  /  TBili  0.3  /  DBili  x   /  AST  12  /  ALT  23  /  AlkPhos  35<L>  09-01                Radiology Results      Meds    MEDICATIONS  (STANDING):  ascorbic acid 1000 milliGRAM(s) Oral daily  cholecalciferol 2000 Unit(s) Oral daily  dexAMETHasone  Injectable 6 milliGRAM(s) IV Push daily  enoxaparin Injectable 40 milliGRAM(s) SubCutaneous daily  insulin lispro (ADMELOG) corrective regimen sliding scale   SubCutaneous Before meals and at bedtime  montelukast 10 milliGRAM(s) Oral at bedtime  pantoprazole    Tablet 40 milliGRAM(s) Oral before breakfast  remdesivir  IVPB   IV Intermittent   remdesivir  IVPB 100 milliGRAM(s) IV Intermittent every 24 hours  zinc sulfate 220 milliGRAM(s) Oral daily      MEDICATIONS  (PRN):  acetaminophen   Tablet .. 650 milliGRAM(s) Oral every 4 hours PRN Temp greater or equal to 38.5C (101.3F)  ALBUTerol    90 MICROgram(s) HFA Inhaler 2 Puff(s) Inhalation every 4 hours PRN Shortness of Breath and/or Wheezing      Physical Exam    Neuro :  no focal deficits  Respiratory: CTA B/L  CV: RRR, S1S2, no murmurs,   Abdominal: Soft, NT, ND +BS,  Extremities: No edema, + peripheral pulses      ASSESSMENT    sepsis,   pneumonia 2nd to covid 19  H/O bilateral inguinal hernia repair        PLAN    d/c rocephin, zithromax given procal neg,   cont decadron,   cont remdesivir x 5 days  cont vit c, vitamin d, zinc, pepcid, singulair,   contact and airborne isolation,   pulm f/u  cont albuterol inhaler,   tmx 98.4  cont tylenol prn,   cont robitussin prn   O2 sat 98% (97% - 100%) ra   cont O2 via nasal canula if needed,   cont lovenox  covid-19 antibody test nucleocapsid positive  covid-19 antibody test spike domain neg    D-dimer, crp, ldh, ferritin, lactate, procalcitonin noted above   ivf ns bolus 1000 ml for hypotension  cont current meds     Patient is a 30y old  Male who presents with a chief complaint of COVID-19 (01 Sep 2021 21:45)    pt seen in icu [  ], reg med floor [  x ], bed [ x ], chair at bedside [   ], a+o x3 [ x ], lethargic [  ],    nad [ x ]        Allergies    No Known Allergies        Vitals    T(F): 97.4 (09-02-21 @ 05:55), Max: 97.6 (09-01-21 @ 20:05)  HR: 86 (09-02-21 @ 05:55) (62 - 105)  BP: 89/51 (09-02-21 @ 05:55) (89/51 - 98/54)  RR: 19 (09-02-21 @ 05:55) (16 - 19)  SpO2: 94% (09-02-21 @ 05:55) (94% - 98%)  Wt(kg): --  CAPILLARY BLOOD GLUCOSE      POCT Blood Glucose.: 160 mg/dL (01 Sep 2021 20:53)      Labs                          14.7   7.28  )-----------( 235      ( 01 Sep 2021 08:37 )             42.4       09-01    141  |  107  |  13  ----------------------------<  118<H>  4.0   |  28  |  0.69    Ca    8.8      01 Sep 2021 08:37  Phos  3.3     09-01  Mg     2.5     09-01    TPro  6.9  /  Alb  2.9<L>  /  TBili  0.3  /  DBili  x   /  AST  12  /  ALT  23  /  AlkPhos  35<L>  09-01                Radiology Results      Meds    MEDICATIONS  (STANDING):  ascorbic acid 1000 milliGRAM(s) Oral daily  cholecalciferol 2000 Unit(s) Oral daily  dexAMETHasone  Injectable 6 milliGRAM(s) IV Push daily  enoxaparin Injectable 40 milliGRAM(s) SubCutaneous daily  insulin lispro (ADMELOG) corrective regimen sliding scale   SubCutaneous Before meals and at bedtime  montelukast 10 milliGRAM(s) Oral at bedtime  pantoprazole    Tablet 40 milliGRAM(s) Oral before breakfast  remdesivir  IVPB   IV Intermittent   remdesivir  IVPB 100 milliGRAM(s) IV Intermittent every 24 hours  zinc sulfate 220 milliGRAM(s) Oral daily      MEDICATIONS  (PRN):  acetaminophen   Tablet .. 650 milliGRAM(s) Oral every 4 hours PRN Temp greater or equal to 38.5C (101.3F)  ALBUTerol    90 MICROgram(s) HFA Inhaler 2 Puff(s) Inhalation every 4 hours PRN Shortness of Breath and/or Wheezing      Physical Exam    Neuro :  no focal deficits  Respiratory: CTA B/L  CV: RRR, S1S2, no murmurs,   Abdominal: Soft, NT, ND +BS,  Extremities: No edema, + peripheral pulses      ASSESSMENT    sepsis,   pneumonia 2nd to covid 19  H/O bilateral inguinal hernia repair        PLAN    d/c rocephin, zithromax given procal neg,   cont decadron,   cont remdesivir x 5 days  cont vit c, vitamin d, zinc, pepcid, singulair,   contact and airborne isolation,   pulm f/u  cont albuterol inhaler,   afebrile  cont tylenol prn,   cont robitussin prn   O2 sat 94% (94% - 98%) ra   cont O2 via nasal canula if needed,   cont lovenox  covid-19 antibody test nucleocapsid positive  covid-19 antibody test spike domain neg    D-dimer, crp, ldh, ferritin, lactate, procalcitonin noted    ivf ns bolus 1000 ml for hypotension  cont current meds   d/c plan pending stable bp

## 2021-09-02 NOTE — PROGRESS NOTE ADULT - SUBJECTIVE AND OBJECTIVE BOX
Patient is a 30y old  Male who presents with a chief complaint of COVID-19 (02 Sep 2021 06:28)    OVERNIGHT EVENTS: episodes of hypotension this AM, asymptomatic, currently on room air     REVIEW OF SYSTEMS:  CONSTITUTIONAL: No fever, chills  ENMT:  No difficulty hearing, no change in vision  NECK: No pain or stiffness  RESPIRATORY: No cough, SOB  CARDIOVASCULAR: No chest pain, palpitations  GASTROINTESTINAL: No abdominal pain. No nausea, vomiting, or diarrhea  GENITOURINARY: No dysuria  NEUROLOGICAL: No HA  MUSCULOSKELETAL: No joint pain or swelling; No muscle, back, or extremity pain      T(C): 36.3 (09-02-21 @ 05:55), Max: 36.4 (09-01-21 @ 20:05)  HR: 85 (09-02-21 @ 07:09) (62 - 105)  BP: 87/57 (09-02-21 @ 07:09) (87/57 - 98/54)  RR: 19 (09-02-21 @ 05:55) (16 - 19)  SpO2: 94% (09-02-21 @ 05:55) (94% - 98%)  Wt(kg): --Vital Signs Last 24 Hrs  T(C): 36.3 (02 Sep 2021 05:55), Max: 36.4 (01 Sep 2021 20:05)  T(F): 97.4 (02 Sep 2021 05:55), Max: 97.6 (01 Sep 2021 20:05)  HR: 85 (02 Sep 2021 07:09) (62 - 105)  BP: 87/57 (02 Sep 2021 07:09) (87/57 - 98/54)  BP(mean): --  RR: 19 (02 Sep 2021 05:55) (16 - 19)  SpO2: 94% (02 Sep 2021 05:55) (94% - 98%)    MEDICATIONS  (STANDING):  ascorbic acid 1000 milliGRAM(s) Oral daily  cholecalciferol 2000 Unit(s) Oral daily  dexAMETHasone  Injectable 6 milliGRAM(s) IV Push daily  enoxaparin Injectable 40 milliGRAM(s) SubCutaneous daily  insulin lispro (ADMELOG) corrective regimen sliding scale   SubCutaneous Before meals and at bedtime  montelukast 10 milliGRAM(s) Oral at bedtime  pantoprazole    Tablet 40 milliGRAM(s) Oral before breakfast  remdesivir  IVPB   IV Intermittent   remdesivir  IVPB 100 milliGRAM(s) IV Intermittent every 24 hours  zinc sulfate 220 milliGRAM(s) Oral daily    MEDICATIONS  (PRN):  acetaminophen   Tablet .. 650 milliGRAM(s) Oral every 4 hours PRN Temp greater or equal to 38.5C (101.3F)  ALBUTerol    90 MICROgram(s) HFA Inhaler 2 Puff(s) Inhalation every 4 hours PRN Shortness of Breath and/or Wheezing      PHYSICAL EXAM:  GENERAL: NAD  EYES: clear conjunctiva  ENMT: Moist mucous membranes  NECK: Supple, No JVD  CHEST/LUNG: Clear to diminished bilaterally; No rales, rhonchi, wheezing, or rubs  HEART: S1, S2, Regular rate and rhythm  ABDOMEN: Soft, Nontender, Nondistended; Bowel sounds present  NEURO: Alert & Oriented X3  EXTREMITIES: No LE edema, no calf tenderness  SKIN: No rashes or lesions    Consultant(s) Notes Reviewed:  [x ] YES  [ ] NO  Care Discussed with Consultants/Other Providers [ x] YES  [ ] NO    LABS:                        14.7   7.28  )-----------( 235      ( 01 Sep 2021 08:37 )             42.4     09-01  141  |  107  |  13  ----------------------------<  118<H>  4.0   |  28  |  0.69    Ca    8.8      01 Sep 2021 08:37  Phos  3.3     09-01  Mg     2.5     09-01    TPro  6.9  /  Alb  2.9<L>  /  TBili  0.3  /  DBili  x   /  AST  12  /  ALT  23  /  AlkPhos  35<L>  09-01    CAPILLARY BLOOD GLUCOSE    POCT Blood Glucose.: 101 mg/dL (02 Sep 2021 08:12)  POCT Blood Glucose.: 160 mg/dL (01 Sep 2021 20:53)  POCT Blood Glucose.: 164 mg/dL (01 Sep 2021 16:43)  POCT Blood Glucose.: 122 mg/dL (01 Sep 2021 11:46)    RADIOLOGY & ADDITIONAL TESTS:    < from: Xray Chest 1 View- PORTABLE-Urgent (08.30.21 @ 15:46) >    EXAM:  XR CHEST PORTABLE URGENT 1V                            PROCEDURE DATE:  08/30/2021          INTERPRETATION:  AP erect chest on August 30, 2021 at 3:40 PM. Patient is short of breath with cough and fever.    COMPARISON: None available.    Heart size is within normal limits.    There is slight scattered minimal lower lung field infiltrates mainly at the lung bases right greater than left. These findings are consistent with Covid Pneumonia.    IMPRESSION: Bilateral infiltrates as above.    < end of copied text >      Imaging Personally Reviewed:  [ ] YES  [ ] NO

## 2021-09-03 VITALS
HEART RATE: 78 BPM | TEMPERATURE: 98 F | OXYGEN SATURATION: 97 % | SYSTOLIC BLOOD PRESSURE: 97 MMHG | DIASTOLIC BLOOD PRESSURE: 63 MMHG | RESPIRATION RATE: 20 BRPM

## 2021-09-03 LAB
GLUCOSE BLDC GLUCOMTR-MCNC: 109 MG/DL — HIGH (ref 70–99)
GLUCOSE BLDC GLUCOMTR-MCNC: 208 MG/DL — HIGH (ref 70–99)

## 2021-09-03 PROCEDURE — 83036 HEMOGLOBIN GLYCOSYLATED A1C: CPT

## 2021-09-03 PROCEDURE — 36415 COLL VENOUS BLD VENIPUNCTURE: CPT

## 2021-09-03 PROCEDURE — 87635 SARS-COV-2 COVID-19 AMP PRB: CPT

## 2021-09-03 PROCEDURE — 85025 COMPLETE CBC W/AUTO DIFF WBC: CPT

## 2021-09-03 PROCEDURE — 84145 PROCALCITONIN (PCT): CPT

## 2021-09-03 PROCEDURE — 83735 ASSAY OF MAGNESIUM: CPT

## 2021-09-03 PROCEDURE — 71045 X-RAY EXAM CHEST 1 VIEW: CPT

## 2021-09-03 PROCEDURE — 80061 LIPID PANEL: CPT

## 2021-09-03 PROCEDURE — 86140 C-REACTIVE PROTEIN: CPT

## 2021-09-03 PROCEDURE — 85379 FIBRIN DEGRADATION QUANT: CPT

## 2021-09-03 PROCEDURE — 83615 LACTATE (LD) (LDH) ENZYME: CPT

## 2021-09-03 PROCEDURE — 86769 SARS-COV-2 COVID-19 ANTIBODY: CPT

## 2021-09-03 PROCEDURE — 82728 ASSAY OF FERRITIN: CPT

## 2021-09-03 PROCEDURE — 84443 ASSAY THYROID STIM HORMONE: CPT

## 2021-09-03 PROCEDURE — 80053 COMPREHEN METABOLIC PANEL: CPT

## 2021-09-03 PROCEDURE — 85027 COMPLETE CBC AUTOMATED: CPT

## 2021-09-03 PROCEDURE — 84100 ASSAY OF PHOSPHORUS: CPT

## 2021-09-03 PROCEDURE — 93005 ELECTROCARDIOGRAM TRACING: CPT

## 2021-09-03 PROCEDURE — 82962 GLUCOSE BLOOD TEST: CPT

## 2021-09-03 PROCEDURE — 84484 ASSAY OF TROPONIN QUANT: CPT

## 2021-09-03 PROCEDURE — 85384 FIBRINOGEN ACTIVITY: CPT

## 2021-09-03 PROCEDURE — 99285 EMERGENCY DEPT VISIT HI MDM: CPT

## 2021-09-03 RX ADMIN — Medication 2: at 12:23

## 2021-09-03 RX ADMIN — Medication 1000 MILLIGRAM(S): at 11:36

## 2021-09-03 RX ADMIN — Medication 6 MILLIGRAM(S): at 05:22

## 2021-09-03 RX ADMIN — ZINC SULFATE TAB 220 MG (50 MG ZINC EQUIVALENT) 220 MILLIGRAM(S): 220 (50 ZN) TAB at 11:36

## 2021-09-03 RX ADMIN — PANTOPRAZOLE SODIUM 40 MILLIGRAM(S): 20 TABLET, DELAYED RELEASE ORAL at 05:23

## 2021-09-03 RX ADMIN — Medication 2000 UNIT(S): at 11:36

## 2021-09-03 RX ADMIN — ENOXAPARIN SODIUM 40 MILLIGRAM(S): 100 INJECTION SUBCUTANEOUS at 11:36

## 2021-09-03 NOTE — PROGRESS NOTE ADULT - PROBLEM SELECTOR PLAN 3
Ddimer to 400  likely COVID infection  will consider CTA chest if progressive elevation in D-dimer and symptoms
-in setting of COVID  -cont DVT ppx
Ddimer to 400  likely COVID infection  will consider CTA chest if progressive elevation in D-dimer and symptoms
-in setting of COVID  -cont DVT ppx

## 2021-09-03 NOTE — DISCHARGE NOTE NURSING/CASE MANAGEMENT/SOCIAL WORK - NSDCPEFALRISK_GEN_ALL_CORE
For information on Fall & injury Prevention, visit [email or web address]

## 2021-09-03 NOTE — PROGRESS NOTE ADULT - PROBLEM SELECTOR PLAN 5
-discharge disposition home today, pt has no insurance, pending [facility] meds delivery   -SW following -discharge disposition home today, pt has no insurance, pending [facility] meds delivery, confirmed with pharmacy this AM, meds will be delivered today between 9am-12pm   -discharge disposition, outpt follow up, medication indication/compliance discussed with pt at length with good understanding.   -SW following

## 2021-09-03 NOTE — PROGRESS NOTE ADULT - PROBLEM SELECTOR PLAN 4
-dvt ppx- Lovenox  -gi ppx- PPI
DVT PPX: Lovenox  GI PPX: PPI
DVT PPX: Lovenox  GI PPX: PPI
-dvt ppx- Lovenox  -gi ppx- PPI

## 2021-09-03 NOTE — PROGRESS NOTE ADULT - SUBJECTIVE AND OBJECTIVE BOX
Patient is a 30y old  Male who presents with a chief complaint of COVID-19 (02 Sep 2021 10:32)    OVERNIGHT EVENTS: no acute events overnight, BP improving       REVIEW OF SYSTEMS:  CONSTITUTIONAL: No fever, chills  ENMT:  No difficulty hearing, no change in vision  NECK: No pain or stiffness  RESPIRATORY: No cough, SOB  CARDIOVASCULAR: No chest pain, palpitations  GASTROINTESTINAL: No abdominal pain. No nausea, vomiting, or diarrhea  GENITOURINARY: No dysuria  NEUROLOGICAL: No HA  MUSCULOSKELETAL: No joint pain or swelling; No muscle, back, or extremity pain    T(C): 36.1 (09-03-21 @ 06:00), Max: 36.6 (09-02-21 @ 11:45)  HR: 67 (09-03-21 @ 06:00) (67 - 84)  BP: 123/80 (09-03-21 @ 06:00) (92/63 - 123/80)  RR: 18 (09-03-21 @ 06:00) (17 - 18)  SpO2: 95% (09-03-21 @ 06:00) (95% - 98%)  Wt(kg): --Vital Signs Last 24 Hrs  T(C): 36.1 (03 Sep 2021 06:00), Max: 36.6 (02 Sep 2021 11:45)  T(F): 97 (03 Sep 2021 06:00), Max: 97.9 (02 Sep 2021 11:45)  HR: 67 (03 Sep 2021 06:00) (67 - 84)  BP: 123/80 (03 Sep 2021 06:00) (92/63 - 123/80)  BP(mean): --  RR: 18 (03 Sep 2021 06:00) (17 - 18)  SpO2: 95% (03 Sep 2021 06:00) (95% - 98%)    MEDICATIONS  (STANDING):  ascorbic acid 1000 milliGRAM(s) Oral daily  cholecalciferol 2000 Unit(s) Oral daily  dexAMETHasone  Injectable 6 milliGRAM(s) IV Push daily  enoxaparin Injectable 40 milliGRAM(s) SubCutaneous daily  insulin lispro (ADMELOG) corrective regimen sliding scale   SubCutaneous Before meals and at bedtime  montelukast 10 milliGRAM(s) Oral at bedtime  pantoprazole    Tablet 40 milliGRAM(s) Oral before breakfast  remdesivir  IVPB   IV Intermittent   remdesivir  IVPB 100 milliGRAM(s) IV Intermittent every 24 hours  zinc sulfate 220 milliGRAM(s) Oral daily    MEDICATIONS  (PRN):  acetaminophen   Tablet .. 650 milliGRAM(s) Oral every 4 hours PRN Temp greater or equal to 38.5C (101.3F)  ALBUTerol    90 MICROgram(s) HFA Inhaler 2 Puff(s) Inhalation every 4 hours PRN Shortness of Breath and/or Wheezing      PHYSICAL EXAM:  GENERAL: NAD  EYES: clear conjunctiva  ENMT: Moist mucous membranes  NECK: Supple, No JVD  CHEST/LUNG: Clear to diminished bilaterally; No rales, rhonchi, wheezing, or rubs  HEART: S1, S2, Regular rate and rhythm  ABDOMEN: Soft, Nontender, Nondistended; Bowel sounds present  NEURO: Alert & Oriented X3  EXTREMITIES: No LE edema, no calf tenderness      Consultant(s) Notes Reviewed:  [x ] YES  [ ] NO  Care Discussed with Consultants/Other Providers [ x] YES  [ ] NO    LABS:                        14.7   7.28  )-----------( 235      ( 01 Sep 2021 08:37 )             42.4     09-01    141  |  107  |  13  ----------------------------<  118<H>  4.0   |  28  |  0.69    Ca    8.8      01 Sep 2021 08:37  Phos  3.3     09-01  Mg     2.5     09-01    TPro  6.9  /  Alb  2.9<L>  /  TBili  0.3  /  DBili  x   /  AST  12  /  ALT  23  /  AlkPhos  35<L>  09-01    CAPILLARY BLOOD GLUCOSE  POCT Blood Glucose.: 160 mg/dL (02 Sep 2021 20:54)  POCT Blood Glucose.: 109 mg/dL (02 Sep 2021 16:54)  POCT Blood Glucose.: 221 mg/dL (02 Sep 2021 11:31)  POCT Blood Glucose.: 101 mg/dL (02 Sep 2021 08:12)    RADIOLOGY & ADDITIONAL TESTS:    < from: Xray Chest 1 View- PORTABLE-Urgent (08.30.21 @ 15:46) >    EXAM:  XR CHEST PORTABLE URGENT 1V                            PROCEDURE DATE:  08/30/2021          INTERPRETATION:  AP erect chest on August 30, 2021 at 3:40 PM. Patient is short of breath with cough and fever.    COMPARISON: None available.    Heart size is within normal limits.    There is slight scattered minimal lower lung field infiltrates mainly at the lung bases right greater than left. These findings are consistent with Covid Pneumonia.    IMPRESSION: Bilateral infiltrates as above.      < end of copied text >      Imaging Personally Reviewed:  [ ] YES  [ ] NO

## 2021-09-03 NOTE — PROGRESS NOTE ADULT - SUBJECTIVE AND OBJECTIVE BOX
Patient is a 30y old  Male who presents with a chief complaint of COVID-19 (03 Sep 2021 08:06)    pt seen in icu [  ], reg med floor [  x ], bed [ x ], chair at bedside [   ], a+o x3 [ x ], lethargic [  ],    nad [ x ]        Allergies    No Known Allergies        Vitals    T(F): 97 (09-03-21 @ 06:00), Max: 97.9 (09-02-21 @ 11:45)  HR: 81 (09-03-21 @ 08:30) (67 - 84)  BP: 93/58 (09-03-21 @ 08:30) (92/63 - 123/80)  RR: 18 (09-03-21 @ 08:30) (17 - 18)  SpO2: 96% (09-03-21 @ 08:30) (95% - 98%)  Wt(kg): --  CAPILLARY BLOOD GLUCOSE      POCT Blood Glucose.: 109 mg/dL (03 Sep 2021 08:06)      Labs                          Radiology Results      Meds    MEDICATIONS  (STANDING):  ascorbic acid 1000 milliGRAM(s) Oral daily  cholecalciferol 2000 Unit(s) Oral daily  dexAMETHasone  Injectable 6 milliGRAM(s) IV Push daily  enoxaparin Injectable 40 milliGRAM(s) SubCutaneous daily  insulin lispro (ADMELOG) corrective regimen sliding scale   SubCutaneous Before meals and at bedtime  montelukast 10 milliGRAM(s) Oral at bedtime  pantoprazole    Tablet 40 milliGRAM(s) Oral before breakfast  remdesivir  IVPB   IV Intermittent   remdesivir  IVPB 100 milliGRAM(s) IV Intermittent every 24 hours  zinc sulfate 220 milliGRAM(s) Oral daily      MEDICATIONS  (PRN):  acetaminophen   Tablet .. 650 milliGRAM(s) Oral every 4 hours PRN Temp greater or equal to 38.5C (101.3F)  ALBUTerol    90 MICROgram(s) HFA Inhaler 2 Puff(s) Inhalation every 4 hours PRN Shortness of Breath and/or Wheezing      Physical Exam    Neuro :  no focal deficits  Respiratory: CTA B/L  CV: RRR, S1S2, no murmurs,   Abdominal: Soft, NT, ND +BS,  Extremities: No edema, + peripheral pulses      ASSESSMENT    sepsis on adm,   pneumonia 2nd to covid 19  H/O bilateral inguinal hernia repair        PLAN    d/c rocephin, zithromax given procal neg,   cont decadron to complete 10 days,   remdesivir x 5 days completed today   cont vit c, vitamin d, zinc, pepcid, singulair,   contact and airborne isolation,   pulm f/u  cont albuterol inhaler,   afebrile  cont tylenol prn,   cont robitussin prn   O2 sat  96% (95% - 98%) ra   start xarelto x 30 days   covid-19 antibody test nucleocapsid positive  covid-19 antibody test spike domain neg    D-dimer, crp, ldh, ferritin, lactate, procalcitonin noted    s/p ivf ns bolus 1000 ml for hypotension   bp stable  cont current meds   pt stable for d/c

## 2021-09-03 NOTE — PROGRESS NOTE ADULT - ASSESSMENT
1. PNA 2nd to Covid-19  - PCR positive.   - CXR noted.  - Continue Vit C, Vit D, Zinc   - Started Remdesivir   - Continue Dexamethasone   - Continue Singulair and Pepcid   - Robitussin PRN for cough  - Tylenol PRN for temp   - O2 Supp - taper as tolerated by pt.   - Monitor O2 Sat.  - Monitor labs  - F.U CXR  - Prone positioning as tolerated   - Isolation precautions   - DVT and GI PPX 
30 year old male, , no significant PMH. Patient presented to ED with fevers, myalgias, cough, SOB. Bilateral infiltrates on CXR. Patient admitted to medicine for COVID PNA.  Patient started on Remdesivir and decadron. Intermittently  required supplemental oxygen then transitioned off   BP trended down s/p IVF bolus with improvement   Medically optimized for discharge pending [facility] meds delivery 
1. PNA 2nd to Covid-19  - PCR positive.   - CXR noted.  - Continue Vit C, Vit D, Zinc   - Received Remdesivir   - Continue Dexamethasone   - Continue Singulair and Pepcid   - Robitussin PRN for cough  - Tylenol PRN for temp   - Monitor O2 Sat.  - Monitor labs  - F.U CXR  - Isolation precautions   - DVT and GI PPX   - D/C planning for today. 
1. PNA 2nd to Covid-19  - PCR positive.   - CXR noted.  - Continue Vit C, Vit D, Zinc   - Started Remdesivir   - Continue Dexamethasone   - Continue Singulair and Pepcid   - Robitussin PRN for cough  - Tylenol PRN for temp   - O2 Supp - taper as tolerated by pt.   - Monitor O2 Sat.  - Monitor labs  - F.U CXR  - Prone positioning as tolerated   - Isolation precautions   - DVT and GI PPX 
30 year old male, , no significant PMH. Patient presented to ED with fevers, myalgias, cough, SOB. Bilateral infiltrates on CXR. Patient admitted to medicine for COVID PNA.  Patient started on Remdesivir and decadron. Intermittently  required supplemental oxygen then transitioned off   BP trending down  this AM, s/p IVF bolus 
Patient is a 30 year old male, , no significant PMH. Patient presented to ED with fevers, myalgias, cough, SOB. Bilateral infiltrates on CXR. Patient admitted to medicine for COVID PNA, SPO2 94% on RA, requiring O2 supplementation with NC. Patient started on Remdesivir and decadron. Patient unvaccinated for COVID-19. 
Patient is a 30 year old male, , no significant PMH. Patient presented to ED with fevers, myalgias, cough, SOB. Bilateral infiltrates on CXR. Patient admitted to medicine for COVID PNA, SPO2 94% on RA, requiring O2 supplementation with NC. Patient started on Remdesivir and decadron. Patient unvaccinated for COVID-19.

## 2021-09-03 NOTE — PROGRESS NOTE ADULT - SUBJECTIVE AND OBJECTIVE BOX
Pt is awake, alert, lying in bed in NAD. For D/C home today.     INTERVAL HPI/OVERNIGHT EVENTS:      VITAL SIGNS:  T(F): 97 (09-03-21 @ 06:00)  HR: 81 (09-03-21 @ 08:30)  BP: 93/58 (09-03-21 @ 08:30)  RR: 18 (09-03-21 @ 08:30)  SpO2: 96% (09-03-21 @ 08:30)  Wt(kg): --  I&O's Detail          REVIEW OF SYSTEMS:    CONSTITUTIONAL:  No fevers, chills, sweats    HEENT:  Eyes:  No diplopia or blurred vision. ENT:  No earache, sore throat or runny nose.    CARDIOVASCULAR:  No pressure, squeezing, tightness, or heaviness about the chest; no palpitations.    RESPIRATORY:  Per HPI    GASTROINTESTINAL:  No abdominal pain, nausea, vomiting or diarrhea.    GENITOURINARY:  No dysuria, frequency or urgency.    NEUROLOGIC:  No paresthesias, fasciculations, seizures or weakness.    PSYCHIATRIC:  No disorder of thought or mood.      PHYSICAL EXAM:    Constitutional: Well developed and nourished  Eyes:Perrla  ENMT: normal  Neck:supple  Respiratory: good air entry  Cardiovascular: S1 S2 regular  Gastrointestinal: Soft, Non tender  Extremities: No edema  Vascular:normal  Neurological:Awake, alert,Ox3  Musculoskeletal:Normal      MEDICATIONS  (STANDING):  ascorbic acid 1000 milliGRAM(s) Oral daily  cholecalciferol 2000 Unit(s) Oral daily  dexAMETHasone  Injectable 6 milliGRAM(s) IV Push daily  enoxaparin Injectable 40 milliGRAM(s) SubCutaneous daily  insulin lispro (ADMELOG) corrective regimen sliding scale   SubCutaneous Before meals and at bedtime  montelukast 10 milliGRAM(s) Oral at bedtime  pantoprazole    Tablet 40 milliGRAM(s) Oral before breakfast  remdesivir  IVPB   IV Intermittent   remdesivir  IVPB 100 milliGRAM(s) IV Intermittent every 24 hours  zinc sulfate 220 milliGRAM(s) Oral daily    MEDICATIONS  (PRN):  acetaminophen   Tablet .. 650 milliGRAM(s) Oral every 4 hours PRN Temp greater or equal to 38.5C (101.3F)  ALBUTerol    90 MICROgram(s) HFA Inhaler 2 Puff(s) Inhalation every 4 hours PRN Shortness of Breath and/or Wheezing      Allergies    No Known Allergies    Intolerances        LABS:    CAPILLARY BLOOD GLUCOSE      POCT Blood Glucose.: 109 mg/dL (03 Sep 2021 08:06)  POCT Blood Glucose.: 160 mg/dL (02 Sep 2021 20:54)  POCT Blood Glucose.: 109 mg/dL (02 Sep 2021 16:54)  POCT Blood Glucose.: 221 mg/dL (02 Sep 2021 11:31)    pro-bnp -- 08-31 @ 08:12     d-dimer 207  08-31 @ 08:12  pro-bnp -- 08-30 @ 16:12     d-dimer 442  08-30 @ 16:12      RADIOLOGY & ADDITIONAL TESTS:    CXR:    Ct scan chest:    ekg;    echo:

## 2021-09-03 NOTE — PROGRESS NOTE ADULT - PROBLEM SELECTOR PLAN 1
-b/l PNA in setting of COVID 19  -cont Remdesivir 5/5  -cont Dexamethasone   -titrated off of supplemental oxygen   -airborne and contact isolation   -supportive measures  -plan to d/c on Xarelto for covid VTE ppx given pt's sedentary work, pt is

## 2021-09-03 NOTE — DISCHARGE NOTE NURSING/CASE MANAGEMENT/SOCIAL WORK - PATIENT PORTAL LINK FT
You can access the FollowMyHealth Patient Portal offered by [facility] by registering at the following website: [email or web address]. By joining [employer]’s FollowMyHealth portal, you will also be able to view your health information using other applications (apps) compatible with our system.